# Patient Record
Sex: FEMALE | Race: BLACK OR AFRICAN AMERICAN | NOT HISPANIC OR LATINO
[De-identification: names, ages, dates, MRNs, and addresses within clinical notes are randomized per-mention and may not be internally consistent; named-entity substitution may affect disease eponyms.]

---

## 2017-01-17 PROCEDURE — 99214 OFFICE O/P EST MOD 30 MIN: CPT

## 2017-01-19 ENCOUNTER — APPOINTMENT (OUTPATIENT)
Dept: GASTROENTEROLOGY | Facility: HOSPITAL | Age: 72
End: 2017-01-19

## 2017-01-23 ENCOUNTER — RESULT REVIEW (OUTPATIENT)
Age: 72
End: 2017-01-23

## 2017-01-24 ENCOUNTER — OUTPATIENT (OUTPATIENT)
Dept: OUTPATIENT SERVICES | Facility: HOSPITAL | Age: 72
LOS: 1 days | Discharge: HOME | End: 2017-01-24
Payer: MEDICARE

## 2017-01-24 DIAGNOSIS — Z98.89 OTHER SPECIFIED POSTPROCEDURAL STATES: Chronic | ICD-10-CM

## 2017-01-24 DIAGNOSIS — Z98.49 CATARACT EXTRACTION STATUS, UNSPECIFIED EYE: Chronic | ICD-10-CM

## 2017-01-24 DIAGNOSIS — Z00.00 ENCOUNTER FOR GENERAL ADULT MEDICAL EXAMINATION WITHOUT ABNORMAL FINDINGS: ICD-10-CM

## 2017-01-24 PROCEDURE — 88305 TISSUE EXAM BY PATHOLOGIST: CPT | Mod: 26

## 2017-01-24 PROCEDURE — 45380 COLONOSCOPY AND BIOPSY: CPT | Mod: GC

## 2017-01-26 LAB — SURGICAL PATHOLOGY STUDY: SIGNIFICANT CHANGE UP

## 2017-03-02 ENCOUNTER — OUTPATIENT (OUTPATIENT)
Dept: OUTPATIENT SERVICES | Facility: HOSPITAL | Age: 72
LOS: 1 days | End: 2017-03-02

## 2017-03-02 ENCOUNTER — APPOINTMENT (OUTPATIENT)
Dept: GASTROENTEROLOGY | Facility: HOSPITAL | Age: 72
End: 2017-03-02

## 2017-03-02 VITALS
HEART RATE: 56 BPM | BODY MASS INDEX: 22.36 KG/M2 | SYSTOLIC BLOOD PRESSURE: 143 MMHG | DIASTOLIC BLOOD PRESSURE: 79 MMHG | HEIGHT: 64 IN | WEIGHT: 131 LBS

## 2017-03-02 DIAGNOSIS — Z98.89 OTHER SPECIFIED POSTPROCEDURAL STATES: Chronic | ICD-10-CM

## 2017-03-02 DIAGNOSIS — Z87.19 PERSONAL HISTORY OF OTHER DISEASES OF THE DIGESTIVE SYSTEM: ICD-10-CM

## 2017-03-02 DIAGNOSIS — Z98.49 CATARACT EXTRACTION STATUS, UNSPECIFIED EYE: Chronic | ICD-10-CM

## 2017-03-02 DIAGNOSIS — K57.30 DIVERTICULOSIS OF LARGE INTESTINE WITHOUT PERFORATION OR ABSCESS WITHOUT BLEEDING: ICD-10-CM

## 2017-03-02 DIAGNOSIS — K57.30 DIVERTICULOSIS OF LARGE INTESTINE W/OUT PERFORATION OR ABSCESS W/OUT BLEEDING: ICD-10-CM

## 2017-03-02 DIAGNOSIS — Z12.11 ENCOUNTER FOR SCREENING FOR MALIGNANT NEOPLASM OF COLON: ICD-10-CM

## 2017-04-04 PROCEDURE — 99214 OFFICE O/P EST MOD 30 MIN: CPT

## 2017-04-25 ENCOUNTER — OUTPATIENT (OUTPATIENT)
Dept: OUTPATIENT SERVICES | Facility: HOSPITAL | Age: 72
LOS: 1 days | End: 2017-04-25
Payer: MEDICARE

## 2017-04-25 ENCOUNTER — APPOINTMENT (OUTPATIENT)
Dept: INTERNAL MEDICINE | Facility: HOSPITAL | Age: 72
End: 2017-04-25

## 2017-04-25 VITALS — BODY MASS INDEX: 22.71 KG/M2 | WEIGHT: 133 LBS | HEIGHT: 64 IN

## 2017-04-25 VITALS — DIASTOLIC BLOOD PRESSURE: 69 MMHG | SYSTOLIC BLOOD PRESSURE: 168 MMHG | HEART RATE: 42 BPM

## 2017-04-25 DIAGNOSIS — I38 ENDOCARDITIS, VALVE UNSPECIFIED: ICD-10-CM

## 2017-04-25 DIAGNOSIS — Z98.89 OTHER SPECIFIED POSTPROCEDURAL STATES: Chronic | ICD-10-CM

## 2017-04-25 DIAGNOSIS — Z98.49 CATARACT EXTRACTION STATUS, UNSPECIFIED EYE: Chronic | ICD-10-CM

## 2017-04-26 DIAGNOSIS — I10 ESSENTIAL (PRIMARY) HYPERTENSION: ICD-10-CM

## 2017-04-26 DIAGNOSIS — F29 UNSPECIFIED PSYCHOSIS NOT DUE TO A SUBSTANCE OR KNOWN PHYSIOLOGICAL CONDITION: ICD-10-CM

## 2017-04-26 DIAGNOSIS — I38 ENDOCARDITIS, VALVE UNSPECIFIED: ICD-10-CM

## 2017-04-26 DIAGNOSIS — M25.561 PAIN IN RIGHT KNEE: ICD-10-CM

## 2017-06-13 PROCEDURE — 99213 OFFICE O/P EST LOW 20 MIN: CPT

## 2017-09-15 PROCEDURE — 99213 OFFICE O/P EST LOW 20 MIN: CPT

## 2017-09-26 ENCOUNTER — APPOINTMENT (OUTPATIENT)
Dept: OPHTHALMOLOGY | Facility: CLINIC | Age: 72
End: 2017-09-26
Payer: MEDICARE

## 2017-09-26 PROCEDURE — 92014 COMPRE OPH EXAM EST PT 1/>: CPT

## 2017-09-26 PROCEDURE — 92134 CPTRZ OPH DX IMG PST SGM RTA: CPT

## 2017-10-16 ENCOUNTER — MED ADMIN CHARGE (OUTPATIENT)
Age: 72
End: 2017-10-16

## 2017-10-16 ENCOUNTER — APPOINTMENT (OUTPATIENT)
Dept: INTERNAL MEDICINE | Facility: HOSPITAL | Age: 72
End: 2017-10-16
Payer: MEDICARE

## 2017-10-16 ENCOUNTER — OUTPATIENT (OUTPATIENT)
Dept: OUTPATIENT SERVICES | Facility: HOSPITAL | Age: 72
LOS: 1 days | End: 2017-10-16

## 2017-10-16 VITALS — SYSTOLIC BLOOD PRESSURE: 164 MMHG | DIASTOLIC BLOOD PRESSURE: 78 MMHG | HEART RATE: 52 BPM

## 2017-10-16 VITALS — BODY MASS INDEX: 23.22 KG/M2 | WEIGHT: 136 LBS | HEIGHT: 64 IN

## 2017-10-16 DIAGNOSIS — Z98.49 CATARACT EXTRACTION STATUS, UNSPECIFIED EYE: Chronic | ICD-10-CM

## 2017-10-16 DIAGNOSIS — Z23 ENCOUNTER FOR IMMUNIZATION: ICD-10-CM

## 2017-10-16 DIAGNOSIS — Z98.89 OTHER SPECIFIED POSTPROCEDURAL STATES: Chronic | ICD-10-CM

## 2017-10-16 PROCEDURE — 99213 OFFICE O/P EST LOW 20 MIN: CPT | Mod: GE

## 2017-10-19 DIAGNOSIS — R01.1 CARDIAC MURMUR, UNSPECIFIED: ICD-10-CM

## 2017-10-19 DIAGNOSIS — I10 ESSENTIAL (PRIMARY) HYPERTENSION: ICD-10-CM

## 2017-10-26 ENCOUNTER — APPOINTMENT (OUTPATIENT)
Dept: CV DIAGNOSITCS | Facility: HOSPITAL | Age: 72
End: 2017-10-26

## 2017-10-26 ENCOUNTER — OUTPATIENT (OUTPATIENT)
Dept: OUTPATIENT SERVICES | Facility: HOSPITAL | Age: 72
LOS: 1 days | End: 2017-10-26
Payer: MEDICARE

## 2017-10-26 DIAGNOSIS — Z98.49 CATARACT EXTRACTION STATUS, UNSPECIFIED EYE: Chronic | ICD-10-CM

## 2017-10-26 DIAGNOSIS — Z98.89 OTHER SPECIFIED POSTPROCEDURAL STATES: Chronic | ICD-10-CM

## 2017-10-26 DIAGNOSIS — I10 ESSENTIAL (PRIMARY) HYPERTENSION: ICD-10-CM

## 2017-10-26 PROCEDURE — 93306 TTE W/DOPPLER COMPLETE: CPT | Mod: 26

## 2017-11-08 ENCOUNTER — FORM ENCOUNTER (OUTPATIENT)
Age: 72
End: 2017-11-08

## 2017-11-09 ENCOUNTER — OUTPATIENT (OUTPATIENT)
Dept: OUTPATIENT SERVICES | Facility: HOSPITAL | Age: 72
LOS: 1 days | End: 2017-11-09
Payer: MEDICARE

## 2017-11-09 ENCOUNTER — APPOINTMENT (OUTPATIENT)
Dept: MAMMOGRAPHY | Facility: IMAGING CENTER | Age: 72
End: 2017-11-09
Payer: MEDICARE

## 2017-11-09 DIAGNOSIS — Z98.49 CATARACT EXTRACTION STATUS, UNSPECIFIED EYE: Chronic | ICD-10-CM

## 2017-11-09 DIAGNOSIS — Z00.00 ENCOUNTER FOR GENERAL ADULT MEDICAL EXAMINATION WITHOUT ABNORMAL FINDINGS: ICD-10-CM

## 2017-11-09 DIAGNOSIS — R01.1 CARDIAC MURMUR, UNSPECIFIED: ICD-10-CM

## 2017-11-09 DIAGNOSIS — Z98.89 OTHER SPECIFIED POSTPROCEDURAL STATES: Chronic | ICD-10-CM

## 2017-11-09 PROCEDURE — 77063 BREAST TOMOSYNTHESIS BI: CPT | Mod: 26

## 2017-11-09 PROCEDURE — G0202: CPT | Mod: 26

## 2017-11-09 PROCEDURE — 77067 SCR MAMMO BI INCL CAD: CPT

## 2017-11-09 PROCEDURE — 77063 BREAST TOMOSYNTHESIS BI: CPT

## 2017-11-20 ENCOUNTER — APPOINTMENT (OUTPATIENT)
Dept: INTERNAL MEDICINE | Facility: HOSPITAL | Age: 72
End: 2017-11-20
Payer: MEDICARE

## 2017-11-20 ENCOUNTER — OUTPATIENT (OUTPATIENT)
Dept: OUTPATIENT SERVICES | Facility: HOSPITAL | Age: 72
LOS: 1 days | End: 2017-11-20
Payer: MEDICARE

## 2017-11-20 VITALS — SYSTOLIC BLOOD PRESSURE: 185 MMHG | DIASTOLIC BLOOD PRESSURE: 85 MMHG | HEART RATE: 60 BPM

## 2017-11-20 VITALS — HEIGHT: 64 IN | WEIGHT: 136 LBS | BODY MASS INDEX: 23.22 KG/M2

## 2017-11-20 DIAGNOSIS — M25.561 PAIN IN RIGHT KNEE: ICD-10-CM

## 2017-11-20 DIAGNOSIS — Z98.89 OTHER SPECIFIED POSTPROCEDURAL STATES: Chronic | ICD-10-CM

## 2017-11-20 DIAGNOSIS — Z98.49 CATARACT EXTRACTION STATUS, UNSPECIFIED EYE: Chronic | ICD-10-CM

## 2017-11-20 PROCEDURE — 99213 OFFICE O/P EST LOW 20 MIN: CPT | Mod: GE

## 2017-11-22 DIAGNOSIS — I10 ESSENTIAL (PRIMARY) HYPERTENSION: ICD-10-CM

## 2017-11-22 DIAGNOSIS — F29 UNSPECIFIED PSYCHOSIS NOT DUE TO A SUBSTANCE OR KNOWN PHYSIOLOGICAL CONDITION: ICD-10-CM

## 2017-12-28 PROCEDURE — 99213 OFFICE O/P EST LOW 20 MIN: CPT

## 2018-05-14 ENCOUNTER — LABORATORY RESULT (OUTPATIENT)
Age: 73
End: 2018-05-14

## 2018-05-14 ENCOUNTER — APPOINTMENT (OUTPATIENT)
Dept: INTERNAL MEDICINE | Facility: HOSPITAL | Age: 73
End: 2018-05-14
Payer: MEDICARE

## 2018-05-14 ENCOUNTER — OUTPATIENT (OUTPATIENT)
Dept: OUTPATIENT SERVICES | Facility: HOSPITAL | Age: 73
LOS: 1 days | End: 2018-05-14

## 2018-05-14 VITALS — BODY MASS INDEX: 23.73 KG/M2 | HEIGHT: 64 IN | WEIGHT: 139 LBS

## 2018-05-14 VITALS — HEART RATE: 50 BPM | SYSTOLIC BLOOD PRESSURE: 173 MMHG | DIASTOLIC BLOOD PRESSURE: 72 MMHG

## 2018-05-14 DIAGNOSIS — Z98.89 OTHER SPECIFIED POSTPROCEDURAL STATES: Chronic | ICD-10-CM

## 2018-05-14 DIAGNOSIS — Z98.49 CATARACT EXTRACTION STATUS, UNSPECIFIED EYE: Chronic | ICD-10-CM

## 2018-05-14 LAB
ALBUMIN SERPL ELPH-MCNC: 4.4 G/DL — SIGNIFICANT CHANGE UP (ref 3.3–5)
ALP SERPL-CCNC: 75 U/L — SIGNIFICANT CHANGE UP (ref 40–120)
ALT FLD-CCNC: 11 U/L — SIGNIFICANT CHANGE UP (ref 4–33)
AST SERPL-CCNC: 23 U/L — SIGNIFICANT CHANGE UP (ref 4–32)
BASOPHILS # BLD AUTO: 0.02 K/UL — SIGNIFICANT CHANGE UP (ref 0–0.2)
BASOPHILS NFR BLD AUTO: 0.5 % — SIGNIFICANT CHANGE UP (ref 0–2)
BILIRUB SERPL-MCNC: 0.3 MG/DL — SIGNIFICANT CHANGE UP (ref 0.2–1.2)
BUN SERPL-MCNC: 15 MG/DL — SIGNIFICANT CHANGE UP (ref 7–23)
CALCIUM SERPL-MCNC: 9.3 MG/DL — SIGNIFICANT CHANGE UP (ref 8.4–10.5)
CHLORIDE SERPL-SCNC: 100 MMOL/L — SIGNIFICANT CHANGE UP (ref 98–107)
CO2 SERPL-SCNC: 30 MMOL/L — SIGNIFICANT CHANGE UP (ref 22–31)
CREAT SERPL-MCNC: 0.82 MG/DL — SIGNIFICANT CHANGE UP (ref 0.5–1.3)
EOSINOPHIL # BLD AUTO: 0.04 K/UL — SIGNIFICANT CHANGE UP (ref 0–0.5)
EOSINOPHIL NFR BLD AUTO: 0.9 % — SIGNIFICANT CHANGE UP (ref 0–6)
GLUCOSE SERPL-MCNC: 83 MG/DL — SIGNIFICANT CHANGE UP (ref 70–99)
HBA1C BLD-MCNC: 4.9 % — SIGNIFICANT CHANGE UP (ref 4–5.6)
HCT VFR BLD CALC: 37 % — SIGNIFICANT CHANGE UP (ref 34.5–45)
HGB BLD-MCNC: 11.9 G/DL — SIGNIFICANT CHANGE UP (ref 11.5–15.5)
IMM GRANULOCYTES # BLD AUTO: 0.01 # — SIGNIFICANT CHANGE UP
IMM GRANULOCYTES NFR BLD AUTO: 0.2 % — SIGNIFICANT CHANGE UP (ref 0–1.5)
LYMPHOCYTES # BLD AUTO: 1.19 K/UL — SIGNIFICANT CHANGE UP (ref 1–3.3)
LYMPHOCYTES # BLD AUTO: 27.2 % — SIGNIFICANT CHANGE UP (ref 13–44)
MCHC RBC-ENTMCNC: 28.3 PG — SIGNIFICANT CHANGE UP (ref 27–34)
MCHC RBC-ENTMCNC: 32.2 % — SIGNIFICANT CHANGE UP (ref 32–36)
MCV RBC AUTO: 88.1 FL — SIGNIFICANT CHANGE UP (ref 80–100)
MONOCYTES # BLD AUTO: 0.53 K/UL — SIGNIFICANT CHANGE UP (ref 0–0.9)
MONOCYTES NFR BLD AUTO: 12.1 % — SIGNIFICANT CHANGE UP (ref 2–14)
NEUTROPHILS # BLD AUTO: 2.59 K/UL — SIGNIFICANT CHANGE UP (ref 1.8–7.4)
NEUTROPHILS NFR BLD AUTO: 59.1 % — SIGNIFICANT CHANGE UP (ref 43–77)
NRBC # FLD: 0 — SIGNIFICANT CHANGE UP
PLATELET # BLD AUTO: 184 K/UL — SIGNIFICANT CHANGE UP (ref 150–400)
PMV BLD: 13.5 FL — HIGH (ref 7–13)
POTASSIUM SERPL-MCNC: 4.1 MMOL/L — SIGNIFICANT CHANGE UP (ref 3.5–5.3)
POTASSIUM SERPL-SCNC: 4.1 MMOL/L — SIGNIFICANT CHANGE UP (ref 3.5–5.3)
PROT SERPL-MCNC: 7.3 G/DL — SIGNIFICANT CHANGE UP (ref 6–8.3)
RBC # BLD: 4.2 M/UL — SIGNIFICANT CHANGE UP (ref 3.8–5.2)
RBC # FLD: 13.8 % — SIGNIFICANT CHANGE UP (ref 10.3–14.5)
SODIUM SERPL-SCNC: 140 MMOL/L — SIGNIFICANT CHANGE UP (ref 135–145)
WBC # BLD: 4.38 K/UL — SIGNIFICANT CHANGE UP (ref 3.8–10.5)
WBC # FLD AUTO: 4.38 K/UL — SIGNIFICANT CHANGE UP (ref 3.8–10.5)

## 2018-05-14 PROCEDURE — 99213 OFFICE O/P EST LOW 20 MIN: CPT | Mod: GE

## 2018-05-15 DIAGNOSIS — F29 UNSPECIFIED PSYCHOSIS NOT DUE TO A SUBSTANCE OR KNOWN PHYSIOLOGICAL CONDITION: ICD-10-CM

## 2018-05-15 DIAGNOSIS — M85.80 OTHER SPECIFIED DISORDERS OF BONE DENSITY AND STRUCTURE, UNSPECIFIED SITE: ICD-10-CM

## 2018-05-15 DIAGNOSIS — I10 ESSENTIAL (PRIMARY) HYPERTENSION: ICD-10-CM

## 2018-05-15 LAB
VZV IGG FLD QL IA: 627.6 INDEX — SIGNIFICANT CHANGE UP
VZV IGG FLD QL IA: POSITIVE — SIGNIFICANT CHANGE UP

## 2018-06-07 PROCEDURE — 99213 OFFICE O/P EST LOW 20 MIN: CPT

## 2018-06-15 ENCOUNTER — OUTPATIENT (OUTPATIENT)
Dept: OUTPATIENT SERVICES | Facility: HOSPITAL | Age: 73
LOS: 1 days | End: 2018-06-15
Payer: MEDICARE

## 2018-06-15 ENCOUNTER — APPOINTMENT (OUTPATIENT)
Dept: DERMATOLOGY | Facility: HOSPITAL | Age: 73
End: 2018-06-15
Payer: MEDICARE

## 2018-06-15 VITALS
HEART RATE: 43 BPM | WEIGHT: 139 LBS | HEIGHT: 64 IN | SYSTOLIC BLOOD PRESSURE: 155 MMHG | BODY MASS INDEX: 23.73 KG/M2 | DIASTOLIC BLOOD PRESSURE: 76 MMHG

## 2018-06-15 DIAGNOSIS — L72.0 EPIDERMAL CYST: ICD-10-CM

## 2018-06-15 DIAGNOSIS — Z98.49 CATARACT EXTRACTION STATUS, UNSPECIFIED EYE: Chronic | ICD-10-CM

## 2018-06-15 DIAGNOSIS — Z98.89 OTHER SPECIFIED POSTPROCEDURAL STATES: Chronic | ICD-10-CM

## 2018-06-15 PROCEDURE — 99201 OFFICE OUTPATIENT NEW 10 MINUTES: CPT | Mod: GC

## 2018-07-22 PROBLEM — K57.30 DIVERTICULOSIS OF COLON: Status: ACTIVE | Noted: 2017-03-02

## 2018-07-27 ENCOUNTER — LABORATORY RESULT (OUTPATIENT)
Age: 73
End: 2018-07-27

## 2018-07-27 ENCOUNTER — APPOINTMENT (OUTPATIENT)
Dept: DERMATOLOGY | Facility: CLINIC | Age: 73
End: 2018-07-27
Payer: MEDICARE

## 2018-07-27 PROCEDURE — 12031 INTMD RPR S/A/T/EXT 2.5 CM/<: CPT

## 2018-07-27 PROCEDURE — 11402 EXC TR-EXT B9+MARG 1.1-2 CM: CPT

## 2018-08-03 ENCOUNTER — APPOINTMENT (OUTPATIENT)
Dept: DERMATOLOGY | Facility: CLINIC | Age: 73
End: 2018-08-03
Payer: MEDICARE

## 2018-08-03 VITALS — WEIGHT: 139 LBS | HEIGHT: 64 IN | BODY MASS INDEX: 23.73 KG/M2

## 2018-08-03 DIAGNOSIS — L72.0 EPIDERMAL CYST: ICD-10-CM

## 2018-08-03 PROCEDURE — 99024 POSTOP FOLLOW-UP VISIT: CPT

## 2018-08-27 ENCOUNTER — APPOINTMENT (OUTPATIENT)
Dept: DERMATOLOGY | Facility: CLINIC | Age: 73
End: 2018-08-27
Payer: MEDICARE

## 2018-08-27 PROCEDURE — 99212 OFFICE O/P EST SF 10 MIN: CPT

## 2018-08-30 PROCEDURE — 99213 OFFICE O/P EST LOW 20 MIN: CPT

## 2018-10-09 ENCOUNTER — APPOINTMENT (OUTPATIENT)
Dept: OPHTHALMOLOGY | Facility: CLINIC | Age: 73
End: 2018-10-09
Payer: MEDICARE

## 2018-10-09 DIAGNOSIS — H43.812 VITREOUS DEGENERATION, LEFT EYE: ICD-10-CM

## 2018-10-09 DIAGNOSIS — H43.393 OTHER VITREOUS OPACITIES, BILATERAL: ICD-10-CM

## 2018-10-09 PROCEDURE — 92134 CPTRZ OPH DX IMG PST SGM RTA: CPT

## 2018-10-09 PROCEDURE — 92014 COMPRE OPH EXAM EST PT 1/>: CPT

## 2018-10-23 ENCOUNTER — LABORATORY RESULT (OUTPATIENT)
Age: 73
End: 2018-10-23

## 2018-10-23 ENCOUNTER — OTHER (OUTPATIENT)
Age: 73
End: 2018-10-23

## 2018-10-23 ENCOUNTER — MED ADMIN CHARGE (OUTPATIENT)
Age: 73
End: 2018-10-23

## 2018-10-23 ENCOUNTER — APPOINTMENT (OUTPATIENT)
Dept: INTERNAL MEDICINE | Facility: HOSPITAL | Age: 73
End: 2018-10-23
Payer: MEDICARE

## 2018-10-23 ENCOUNTER — OUTPATIENT (OUTPATIENT)
Dept: OUTPATIENT SERVICES | Facility: HOSPITAL | Age: 73
LOS: 1 days | End: 2018-10-23
Payer: MEDICARE

## 2018-10-23 VITALS — SYSTOLIC BLOOD PRESSURE: 196 MMHG | DIASTOLIC BLOOD PRESSURE: 76 MMHG | HEART RATE: 52 BPM

## 2018-10-23 VITALS — WEIGHT: 140 LBS | HEIGHT: 64 IN | BODY MASS INDEX: 23.9 KG/M2

## 2018-10-23 DIAGNOSIS — Z98.89 OTHER SPECIFIED POSTPROCEDURAL STATES: Chronic | ICD-10-CM

## 2018-10-23 DIAGNOSIS — Z23 ENCOUNTER FOR IMMUNIZATION: ICD-10-CM

## 2018-10-23 DIAGNOSIS — Z98.49 CATARACT EXTRACTION STATUS, UNSPECIFIED EYE: Chronic | ICD-10-CM

## 2018-10-23 LAB
CHOLEST SERPL-MCNC: 204 MG/DL — HIGH (ref 120–199)
HDLC SERPL-MCNC: 63 MG/DL — SIGNIFICANT CHANGE UP (ref 45–65)
LIPID PNL WITH DIRECT LDL SERPL: 131 MG/DL — SIGNIFICANT CHANGE UP
TRIGL SERPL-MCNC: 50 MG/DL — SIGNIFICANT CHANGE UP (ref 10–149)

## 2018-10-23 PROCEDURE — 99214 OFFICE O/P EST MOD 30 MIN: CPT | Mod: GC

## 2018-10-23 NOTE — PHYSICAL EXAM
[No Acute Distress] : no acute distress [Well Nourished] : well nourished [Well Developed] : well developed [Well-Appearing] : well-appearing [Normal Sclera/Conjunctiva] : normal sclera/conjunctiva [PERRL] : pupils equal round and reactive to light [EOMI] : extraocular movements intact [Normal Outer Ear/Nose] : the outer ears and nose were normal in appearance [Normal Oropharynx] : the oropharynx was normal [No JVD] : no jugular venous distention [Supple] : supple [No Lymphadenopathy] : no lymphadenopathy [No Respiratory Distress] : no respiratory distress  [Clear to Auscultation] : lungs were clear to auscultation bilaterally [No Accessory Muscle Use] : no accessory muscle use [Normal Rate] : normal rate  [Regular Rhythm] : with a regular rhythm [Normal S1, S2] : normal S1 and S2 [No Murmur] : no murmur heard [Pedal Pulses Present] : the pedal pulses are present [No Edema] : there was no peripheral edema [Soft] : abdomen soft [Non Tender] : non-tender [Non-distended] : non-distended [No Masses] : no abdominal mass palpated [No HSM] : no HSM [Normal Bowel Sounds] : normal bowel sounds [Normal Gait] : normal gait [No Focal Deficits] : no focal deficits [Normal Affect] : the affect was normal [Normal Insight/Judgement] : insight and judgment were intact

## 2018-10-26 DIAGNOSIS — F29 UNSPECIFIED PSYCHOSIS NOT DUE TO A SUBSTANCE OR KNOWN PHYSIOLOGICAL CONDITION: ICD-10-CM

## 2018-10-26 DIAGNOSIS — I10 ESSENTIAL (PRIMARY) HYPERTENSION: ICD-10-CM

## 2018-10-26 NOTE — PLAN
[FreeTextEntry1] : The patient is a 73 year old female with a PMH of HTN, bradycardia, and psychosis who presents for a follow-up visit.\par \par 1) Hypertension - Patient's blood pressure was elevated on physical examination. Repeat blood pressure in left arm was 194/69 mm Hg. Patient has a documented history of white coat hypertension. Patient is currently asymptomatic.\par - continue with low-salt diet and exercise\par - ordered lipid panel; awaiting results. \par - continue to monitor\par \par 2) Psychosis - Continue to monitor. \par - Continue with risperidone 0.5 mg qhs.\par \par 3) Health Maintenance \par - Patient's last mammogram was in 2017. The results were normal. She is now 73 years old, so a mammogram is no longer indicated. \par - she has a history of TAHBSO, so pap smears have not been needed.\par - Per GI, colonoscopies are no longer needed after age 75\par - She received her influenza vaccine today. \par - will address DEXA scan at next visit\par \par Patient discussed with Dr. Cortez.\par All risks and benefits were discussed with patient. \par Patient and attending agreed with the above assessment and plan.\par \par \par Precious Montejo M.D. PGY1\par Internal Medicine\par [facility]\par [phone]

## 2018-10-26 NOTE — HISTORY OF PRESENT ILLNESS
[FreeTextEntry1] : The patient is a 73 year old female with a PMH of HTN, bradycardia, and psychosis who presents for a follow-up visit. [de-identified] : 1) Hypertension - The patient is not on any antihypertensives. She measures her blood pressure regularly with her home blood pressure cuff. Her systolic blood pressure ranges between 120s and 130s, with the occasional 140-150 mm Hg reading. Diastolically, she is consistently in the 60s-70s. She manages her blood pressure with a low-salt diet. She eats fresh fruits and vegetables daily. She walks 30 minutes daily. She gardens in the summer time and does household chores all year round. She sleeps approximately 7 hours per night. She does not drink alcohol, smoke cigarettes or use drugs. She denies dizziness, headache, chest pain, SOB, or palpitations.\par \par 2) Psychosis - the patient sees her psychiatrist every three months. Her psychiatrist has told her that she is doing well. She takes her risperidone every night. She denies unusual behavior, auditory hallucinations or visual hallucinations. \par \par 3) Health maintenance - the patient states that she is due for her next mammogram. \par

## 2018-11-15 PROCEDURE — 99213 OFFICE O/P EST LOW 20 MIN: CPT

## 2019-02-06 ENCOUNTER — LABORATORY RESULT (OUTPATIENT)
Age: 74
End: 2019-02-06

## 2019-02-06 ENCOUNTER — APPOINTMENT (OUTPATIENT)
Dept: INTERNAL MEDICINE | Facility: HOSPITAL | Age: 74
End: 2019-02-06
Payer: MEDICARE

## 2019-02-06 ENCOUNTER — OUTPATIENT (OUTPATIENT)
Dept: OUTPATIENT SERVICES | Facility: HOSPITAL | Age: 74
LOS: 1 days | End: 2019-02-06
Payer: MEDICARE

## 2019-02-06 VITALS — HEART RATE: 46 BPM | DIASTOLIC BLOOD PRESSURE: 77 MMHG | SYSTOLIC BLOOD PRESSURE: 169 MMHG

## 2019-02-06 VITALS — HEIGHT: 64 IN | BODY MASS INDEX: 23.39 KG/M2 | WEIGHT: 137 LBS

## 2019-02-06 DIAGNOSIS — Z98.89 OTHER SPECIFIED POSTPROCEDURAL STATES: Chronic | ICD-10-CM

## 2019-02-06 DIAGNOSIS — Z98.49 CATARACT EXTRACTION STATUS, UNSPECIFIED EYE: Chronic | ICD-10-CM

## 2019-02-06 LAB
ANION GAP SERPL CALC-SCNC: 11 MMO/L — SIGNIFICANT CHANGE UP (ref 7–14)
BUN SERPL-MCNC: 10 MG/DL — SIGNIFICANT CHANGE UP (ref 7–23)
CALCIUM SERPL-MCNC: 10.1 MG/DL — SIGNIFICANT CHANGE UP (ref 8.4–10.5)
CHLORIDE SERPL-SCNC: 101 MMOL/L — SIGNIFICANT CHANGE UP (ref 98–107)
CO2 SERPL-SCNC: 30 MMOL/L — SIGNIFICANT CHANGE UP (ref 22–31)
CREAT SERPL-MCNC: 0.94 MG/DL — SIGNIFICANT CHANGE UP (ref 0.5–1.3)
GLUCOSE SERPL-MCNC: 79 MG/DL — SIGNIFICANT CHANGE UP (ref 70–99)
HBA1C BLD-MCNC: 4.6 % — SIGNIFICANT CHANGE UP (ref 4–5.6)
POTASSIUM SERPL-MCNC: 4.4 MMOL/L — SIGNIFICANT CHANGE UP (ref 3.5–5.3)
POTASSIUM SERPL-SCNC: 4.4 MMOL/L — SIGNIFICANT CHANGE UP (ref 3.5–5.3)
SODIUM SERPL-SCNC: 142 MMOL/L — SIGNIFICANT CHANGE UP (ref 135–145)

## 2019-02-06 PROCEDURE — 99214 OFFICE O/P EST MOD 30 MIN: CPT | Mod: GC

## 2019-02-06 NOTE — PLAN
[FreeTextEntry1] : The patient is a 73 year old female with a PMH of HTN, Psychosis, Osteopenia, and Bradycardia\par \par 1) Chronic low back pain - the patient had a DEXA scan in 11/2016 which revealed a normal spine, but osteopenia of the hip and femoral neck. \par - will prescribe Vitamin D 1000 IU and 600 mg Calcium supplements, as they are more potent than OsCAL supplements\par - patient will be referred for a DEXA scan\par - patient will take Tylenol as needed for the pain\par \par 2) Hypertension - the patient continues to experience white coat hypertension as her blood pressure on examination was 169/77 mm Hg. However, home monitoring kit shows that her blood pressure remains elevated at 155-161 mm Hg systolically.\par - blood pressure goal is <150/90 mm Hg \par - will start patient on 5 mg amlodipine po daily and re-assess at next visit\par - will check BMP and HbA1C\par - the patient will continue with her current diet and exercise regimen\par \par 3) Psychosis \par - continue with risperidone 0.5 mg qhs \par - continue to monitor\par \par 4) Systolic Ejection Murmur - the patient's last echocardiogram was in 03/2014.\par - will refer for a new echocardiogram to assess for any changes\par - continue to monitor \par \par 5) HCM - the patient declined the shingrix vaccine.\par - referred patient for DEXA scan\par - the patient will receive her Tdap today\par - the patient is up-to-date on her influenza vaccination\par \par Patient discussed with Dr. Ya.\par All risks and benefits were discussed with patient. \par Patient and attending agreed with the above assessment and plan.\par \par Precious Montejo M.D. PGY1\par Internal Medicine\par [facility]\par [phone]

## 2019-02-06 NOTE — HISTORY OF PRESENT ILLNESS
[FreeTextEntry1] : The patient is here for follow-up. [de-identified] : The patient is a 73 year old female with a PMH of HTN, Psychosis, and Bradycardia\par \par 1) Chronic low back pain - the patient has been having low back pain for many years. At times her back feels weak when doing daily chores and moving around in her daily activities. The pain resolves with Tylenol.  She takes the OsCAL supplements daily.\par \par 2) Hypertension - the patient has been monitoring her blood pressure daily with a home blood pressure cuff. The blood pressure readings range systolically from 155-161 mm Hg mostly. She continues to have 5-6 hours of sleep and walks 15-30 minutes daily. She eats a low-salt diet consisting of broccoli, cabbage, kale, green bananas, plantains, yams, squash, cucumber, eggplant, cauliflower, fish, and chicken. She does not eat canned foods or takeout. She does not drink alcohol, smoke tobacco or use drugs. She denies vision changes, chest pain, palpitations or SOB.\par \par 3) Psychosis - the patient feels well. She continues to take risperidone nightly and sees Psychiatry at [facility] every three months. She denies auditory or visual hallucinations. \par

## 2019-02-06 NOTE — PHYSICAL EXAM
[No Acute Distress] : no acute distress [Well Nourished] : well nourished [Well Developed] : well developed [Well-Appearing] : well-appearing [Normal Sclera/Conjunctiva] : normal sclera/conjunctiva [PERRL] : pupils equal round and reactive to light [EOMI] : extraocular movements intact [Normal Outer Ear/Nose] : the outer ears and nose were normal in appearance [Normal Oropharynx] : the oropharynx was normal [No JVD] : no jugular venous distention [Supple] : supple [No Lymphadenopathy] : no lymphadenopathy [No Respiratory Distress] : no respiratory distress  [Clear to Auscultation] : lungs were clear to auscultation bilaterally [No Accessory Muscle Use] : no accessory muscle use [Normal Rate] : normal rate  [Regular Rhythm] : with a regular rhythm [Normal S1, S2] : normal S1 and S2 [Pedal Pulses Present] : the pedal pulses are present [No Edema] : there was no peripheral edema [Soft] : abdomen soft [Non Tender] : non-tender [Non-distended] : non-distended [No Masses] : no abdominal mass palpated [No HSM] : no HSM [Normal Bowel Sounds] : normal bowel sounds [Normal Posterior Cervical Nodes] : no posterior cervical lymphadenopathy [Normal Anterior Cervical Nodes] : no anterior cervical lymphadenopathy [No Rash] : no rash [Normal Gait] : normal gait [Coordination Grossly Intact] : coordination grossly intact [No Focal Deficits] : no focal deficits [Normal Affect] : the affect was normal [Normal Insight/Judgement] : insight and judgment were intact [de-identified] : 2/6 Systolic Ejection Murmur

## 2019-02-07 DIAGNOSIS — Z23 ENCOUNTER FOR IMMUNIZATION: ICD-10-CM

## 2019-02-07 DIAGNOSIS — F29 UNSPECIFIED PSYCHOSIS NOT DUE TO A SUBSTANCE OR KNOWN PHYSIOLOGICAL CONDITION: ICD-10-CM

## 2019-02-07 DIAGNOSIS — R01.1 CARDIAC MURMUR, UNSPECIFIED: ICD-10-CM

## 2019-02-07 DIAGNOSIS — I10 ESSENTIAL (PRIMARY) HYPERTENSION: ICD-10-CM

## 2019-02-07 DIAGNOSIS — M85.80 OTHER SPECIFIED DISORDERS OF BONE DENSITY AND STRUCTURE, UNSPECIFIED SITE: ICD-10-CM

## 2019-02-14 ENCOUNTER — FORM ENCOUNTER (OUTPATIENT)
Age: 74
End: 2019-02-14

## 2019-02-15 ENCOUNTER — OUTPATIENT (OUTPATIENT)
Dept: OUTPATIENT SERVICES | Facility: HOSPITAL | Age: 74
LOS: 1 days | End: 2019-02-15
Payer: MEDICARE

## 2019-02-15 ENCOUNTER — APPOINTMENT (OUTPATIENT)
Dept: RADIOLOGY | Facility: IMAGING CENTER | Age: 74
End: 2019-02-15
Payer: MEDICARE

## 2019-02-15 ENCOUNTER — OUTPATIENT (OUTPATIENT)
Dept: OUTPATIENT SERVICES | Facility: HOSPITAL | Age: 74
LOS: 1 days | End: 2019-02-15

## 2019-02-15 ENCOUNTER — APPOINTMENT (OUTPATIENT)
Dept: CV DIAGNOSITCS | Facility: HOSPITAL | Age: 74
End: 2019-02-15
Payer: MEDICARE

## 2019-02-15 DIAGNOSIS — Z98.49 CATARACT EXTRACTION STATUS, UNSPECIFIED EYE: Chronic | ICD-10-CM

## 2019-02-15 DIAGNOSIS — M85.80 OTHER SPECIFIED DISORDERS OF BONE DENSITY AND STRUCTURE, UNSPECIFIED SITE: ICD-10-CM

## 2019-02-15 DIAGNOSIS — R01.1 CARDIAC MURMUR, UNSPECIFIED: ICD-10-CM

## 2019-02-15 DIAGNOSIS — Z98.89 OTHER SPECIFIED POSTPROCEDURAL STATES: Chronic | ICD-10-CM

## 2019-02-15 DIAGNOSIS — Z00.00 ENCOUNTER FOR GENERAL ADULT MEDICAL EXAMINATION WITHOUT ABNORMAL FINDINGS: ICD-10-CM

## 2019-02-15 PROCEDURE — 77080 DXA BONE DENSITY AXIAL: CPT | Mod: 26

## 2019-02-15 PROCEDURE — 93306 TTE W/DOPPLER COMPLETE: CPT | Mod: 26

## 2019-02-15 PROCEDURE — 77080 DXA BONE DENSITY AXIAL: CPT

## 2019-03-25 PROCEDURE — 99213 OFFICE O/P EST LOW 20 MIN: CPT

## 2019-05-24 ENCOUNTER — APPOINTMENT (OUTPATIENT)
Dept: INTERNAL MEDICINE | Facility: CLINIC | Age: 74
End: 2019-05-24
Payer: MEDICARE

## 2019-05-24 ENCOUNTER — OUTPATIENT (OUTPATIENT)
Dept: OUTPATIENT SERVICES | Facility: HOSPITAL | Age: 74
LOS: 1 days | End: 2019-05-24

## 2019-05-24 VITALS — HEIGHT: 64 IN | BODY MASS INDEX: 23.22 KG/M2 | WEIGHT: 136 LBS

## 2019-05-24 VITALS — SYSTOLIC BLOOD PRESSURE: 178 MMHG | HEART RATE: 48 BPM | DIASTOLIC BLOOD PRESSURE: 82 MMHG

## 2019-05-24 DIAGNOSIS — Z98.49 CATARACT EXTRACTION STATUS, UNSPECIFIED EYE: Chronic | ICD-10-CM

## 2019-05-24 DIAGNOSIS — Z98.89 OTHER SPECIFIED POSTPROCEDURAL STATES: Chronic | ICD-10-CM

## 2019-05-24 PROCEDURE — 99213 OFFICE O/P EST LOW 20 MIN: CPT | Mod: GE

## 2019-05-29 DIAGNOSIS — F29 UNSPECIFIED PSYCHOSIS NOT DUE TO A SUBSTANCE OR KNOWN PHYSIOLOGICAL CONDITION: ICD-10-CM

## 2019-05-29 DIAGNOSIS — I10 ESSENTIAL (PRIMARY) HYPERTENSION: ICD-10-CM

## 2019-05-29 DIAGNOSIS — R29.898 OTHER SYMPTOMS AND SIGNS INVOLVING THE MUSCULOSKELETAL SYSTEM: ICD-10-CM

## 2019-05-29 DIAGNOSIS — M85.80 OTHER SPECIFIED DISORDERS OF BONE DENSITY AND STRUCTURE, UNSPECIFIED SITE: ICD-10-CM

## 2019-05-29 RX ORDER — AMLODIPINE BESYLATE 5 MG/1
5 TABLET ORAL
Qty: 90 | Refills: 3 | Status: DISCONTINUED | COMMUNITY
Start: 2019-02-06 | End: 2019-05-29

## 2019-05-29 NOTE — PLAN
[FreeTextEntry1] : The patient is a 74 year old female with HTN, Psychosis, and Bradycardia here for a follow-up visit.\par \par 1) Back weakness - will refer to physical therapy.\par \par 2) Hypertension - poorly controlled. \par - Will increase amlodipine from 5 mg daily to 10 mg po daily. \par - Will add chlorthalidone 25 mg po daily.\par - will assess for improvement at next visit.\par \par 3) Psychosis - Continue with Risperidone 0.5 mg po qhs.\par \par 4) HCM - Completed DEXA scan in 02/2019 which showed osteopenia that was present before. Up-to-date on vaccinations and screening tests.\par \par RTC in 5 weeks.\par \par Patient discussed with Dr. Diaz.\par All risks and benefits were discussed with patient. \par Patient and attending agreed with the above assessment and plan.\par \par Precious Montejo M.D. PGY1\par Internal Medicine\par [facility]\par [phone]\par \par \par

## 2019-05-29 NOTE — PHYSICAL EXAM
[Well Nourished] : well nourished [No Acute Distress] : no acute distress [Well-Appearing] : well-appearing [Normal Sclera/Conjunctiva] : normal sclera/conjunctiva [Well Developed] : well developed [PERRL] : pupils equal round and reactive to light [EOMI] : extraocular movements intact [Normal Oropharynx] : the oropharynx was normal [Normal Outer Ear/Nose] : the outer ears and nose were normal in appearance [No JVD] : no jugular venous distention [No Lymphadenopathy] : no lymphadenopathy [Supple] : supple [No Accessory Muscle Use] : no accessory muscle use [Clear to Auscultation] : lungs were clear to auscultation bilaterally [No Respiratory Distress] : no respiratory distress  [Normal S1, S2] : normal S1 and S2 [Regular Rhythm] : with a regular rhythm [Normal Rate] : normal rate  [Pedal Pulses Present] : the pedal pulses are present [No Edema] : there was no peripheral edema [Soft] : abdomen soft [No Masses] : no abdominal mass palpated [Non-distended] : non-distended [Non Tender] : non-tender [Normal Bowel Sounds] : normal bowel sounds [No CVA Tenderness] : no CVA  tenderness [No HSM] : no HSM [Grossly Normal Strength/Tone] : grossly normal strength/tone [No Spinal Tenderness] : no spinal tenderness [Coordination Grossly Intact] : coordination grossly intact [Normal Gait] : normal gait [No Focal Deficits] : no focal deficits [Normal Affect] : the affect was normal [Normal Insight/Judgement] : insight and judgment were intact [de-identified] : Elderly [de-identified] : systolic ejection murmur

## 2019-05-29 NOTE — HISTORY OF PRESENT ILLNESS
[FreeTextEntry1] : The patient is here for follow up.  [de-identified] : The patient is a 74 year old female with HTN, Psychosis, and Bradycardia here for a follow-up visit.\par \par 1) Back weakness - the patient has been experiencing back weakness over the last few weeks. She is not experiencing pain. She cannot lift heavy objects. When she is gardening or doing household chores she feels that she cannot last more than ten minutes. She has to sit or lay down to feel better. She used to do the [employer] exercise program, but it is no longer covered by insurance. So, she has not formally exercised in 1.5 years. She walks around the block twice a week.\par \par 2) Hypertension - the patient has been monitoring her blood pressure daily with a home blood pressure cuff. The blood pressure readings range systolically from 120-147 mm Hg. Her diastolic pressure remains in the 80s. She does not eat foods high in salt or sugar. \par \par 3) Psychosis - the patient denies auditory or visual hallucinations. She has no complaints.

## 2019-06-13 PROCEDURE — 99213 OFFICE O/P EST LOW 20 MIN: CPT

## 2019-06-28 ENCOUNTER — APPOINTMENT (OUTPATIENT)
Dept: INTERNAL MEDICINE | Facility: CLINIC | Age: 74
End: 2019-06-28
Payer: MEDICARE

## 2019-06-28 ENCOUNTER — LABORATORY RESULT (OUTPATIENT)
Age: 74
End: 2019-06-28

## 2019-06-28 ENCOUNTER — OUTPATIENT (OUTPATIENT)
Dept: OUTPATIENT SERVICES | Facility: HOSPITAL | Age: 74
LOS: 1 days | End: 2019-06-28

## 2019-06-28 VITALS — DIASTOLIC BLOOD PRESSURE: 64 MMHG | SYSTOLIC BLOOD PRESSURE: 132 MMHG

## 2019-06-28 VITALS — BODY MASS INDEX: 23.05 KG/M2 | WEIGHT: 135 LBS | HEART RATE: 51 BPM | OXYGEN SATURATION: 98 % | HEIGHT: 64 IN

## 2019-06-28 DIAGNOSIS — I10 ESSENTIAL (PRIMARY) HYPERTENSION: ICD-10-CM

## 2019-06-28 DIAGNOSIS — F29 UNSPECIFIED PSYCHOSIS NOT DUE TO A SUBSTANCE OR KNOWN PHYSIOLOGICAL CONDITION: ICD-10-CM

## 2019-06-28 DIAGNOSIS — Z98.89 OTHER SPECIFIED POSTPROCEDURAL STATES: Chronic | ICD-10-CM

## 2019-06-28 DIAGNOSIS — Z98.49 CATARACT EXTRACTION STATUS, UNSPECIFIED EYE: Chronic | ICD-10-CM

## 2019-06-28 DIAGNOSIS — R29.898 OTHER SYMPTOMS AND SIGNS INVOLVING THE MUSCULOSKELETAL SYSTEM: ICD-10-CM

## 2019-06-28 LAB
ANION GAP SERPL CALC-SCNC: 13 MMO/L — SIGNIFICANT CHANGE UP (ref 7–14)
BUN SERPL-MCNC: 16 MG/DL — SIGNIFICANT CHANGE UP (ref 7–23)
CALCIUM SERPL-MCNC: 10.4 MG/DL — SIGNIFICANT CHANGE UP (ref 8.4–10.5)
CHLORIDE SERPL-SCNC: 97 MMOL/L — LOW (ref 98–107)
CO2 SERPL-SCNC: 30 MMOL/L — SIGNIFICANT CHANGE UP (ref 22–31)
CREAT SERPL-MCNC: 0.87 MG/DL — SIGNIFICANT CHANGE UP (ref 0.5–1.3)
GLUCOSE SERPL-MCNC: 83 MG/DL — SIGNIFICANT CHANGE UP (ref 70–99)
POTASSIUM SERPL-MCNC: 3.7 MMOL/L — SIGNIFICANT CHANGE UP (ref 3.5–5.3)
POTASSIUM SERPL-SCNC: 3.7 MMOL/L — SIGNIFICANT CHANGE UP (ref 3.5–5.3)
SODIUM SERPL-SCNC: 140 MMOL/L — SIGNIFICANT CHANGE UP (ref 135–145)

## 2019-06-28 PROCEDURE — 99214 OFFICE O/P EST MOD 30 MIN: CPT | Mod: GC

## 2019-06-28 NOTE — PHYSICAL EXAM
[No Acute Distress] : no acute distress [Well Nourished] : well nourished [Well Developed] : well developed [Well-Appearing] : well-appearing [PERRL] : pupils equal round and reactive to light [Normal Sclera/Conjunctiva] : normal sclera/conjunctiva [EOMI] : extraocular movements intact [Normal Oropharynx] : the oropharynx was normal [Normal Outer Ear/Nose] : the outer ears and nose were normal in appearance [No JVD] : no jugular venous distention [Supple] : supple [No Lymphadenopathy] : no lymphadenopathy [Thyroid Normal, No Nodules] : the thyroid was normal and there were no nodules present [Clear to Auscultation] : lungs were clear to auscultation bilaterally [No Respiratory Distress] : no respiratory distress  [No Accessory Muscle Use] : no accessory muscle use [Normal Rate] : normal rate  [Normal S1, S2] : normal S1 and S2 [Regular Rhythm] : with a regular rhythm [No Varicosities] : no varicosities [No Abdominal Bruit] : a ~M bruit was not heard ~T in the abdomen [No Carotid Bruits] : no carotid bruits [Pedal Pulses Present] : the pedal pulses are present [No Extremity Clubbing/Cyanosis] : no extremity clubbing/cyanosis [No Edema] : there was no peripheral edema [Soft] : abdomen soft [No Palpable Aorta] : no palpable aorta [No HSM] : no HSM [No Masses] : no abdominal mass palpated [Non Tender] : non-tender [Non-distended] : non-distended [Normal Posterior Cervical Nodes] : no posterior cervical lymphadenopathy [Normal Anterior Cervical Nodes] : no anterior cervical lymphadenopathy [Normal Bowel Sounds] : normal bowel sounds [No Joint Swelling] : no joint swelling [No CVA Tenderness] : no CVA  tenderness [No Spinal Tenderness] : no spinal tenderness [Grossly Normal Strength/Tone] : grossly normal strength/tone [Normal Gait] : normal gait [No Rash] : no rash [Coordination Grossly Intact] : coordination grossly intact [No Focal Deficits] : no focal deficits [Deep Tendon Reflexes (DTR)] : deep tendon reflexes were 2+ and symmetric [Normal Affect] : the affect was normal [Normal Insight/Judgement] : insight and judgment were intact [de-identified] : systolic ejection murmur

## 2019-06-28 NOTE — PLAN
[FreeTextEntry1] : The patient is a 74 year old female with HTN, Psychosis, and Bradycardia here for a follow-up visit.\par \par 1) Back weakness - Continue with physical therapy.\par \par 2) Hypertension - Controlled. Continue with amlodipine 10 mg po daily and chlorthalidone 25 mg po daily.\par \par 3) Psychosis - Continue with Risperidone 0.5 mg po qhs. Follows with Psychiatry every three months.\par \par 4) HCM - Up-to-date on vaccinations and screening tests.\par \par RTC in 6 months.\par Patient discussed with Dr. Ya.\par All risks and benefits were discussed with patient. \par Patient and attending agreed with the above assessment and plan.\par \par Precious Montejo M.D. PGY2\par Internal Medicine\par [facility]\par [phone]

## 2019-06-28 NOTE — HISTORY OF PRESENT ILLNESS
[FreeTextEntry1] : The patient is here for follow up. [de-identified] : The patient is a 74 year old female with HTN, Psychosis, and Bradycardia here for a follow-up visit.\par \par 1) Back weakness - the patient has completed 4-6 sessions of physical therapy. She feels an improvement, but wants to complete more sessions. She also does the exercises at home. She is pleased with the therapy sessions and will continue.\par \par 2) Hypertension - the patient was started on chlorthalidone after her last visit. Her dose of amlodipine was also increased. She feels that she urinates more, but it is manageable. She monitors her blood pressure at home and readings are 100s-130/50-80 mm Hg.  She denies chest pain, SOB, palpitations or lower extremity swelling.\par \par 3) Psychosis - the patient denies auditory or visual hallucinations. She has no complaints. She follows with Psychiatry regularly.

## 2019-07-26 ENCOUNTER — RX RENEWAL (OUTPATIENT)
Age: 74
End: 2019-07-26

## 2019-09-12 PROCEDURE — 99213 OFFICE O/P EST LOW 20 MIN: CPT

## 2019-11-25 ENCOUNTER — APPOINTMENT (OUTPATIENT)
Dept: SURGICAL ONCOLOGY | Facility: CLINIC | Age: 74
End: 2019-11-25
Payer: MEDICARE

## 2019-11-25 VITALS
DIASTOLIC BLOOD PRESSURE: 85 MMHG | SYSTOLIC BLOOD PRESSURE: 194 MMHG | HEART RATE: 64 BPM | WEIGHT: 130 LBS | RESPIRATION RATE: 15 BRPM | HEIGHT: 64 IN | BODY MASS INDEX: 22.2 KG/M2

## 2019-11-25 PROCEDURE — 99204 OFFICE O/P NEW MOD 45 MIN: CPT

## 2019-12-04 ENCOUNTER — APPOINTMENT (OUTPATIENT)
Dept: VASCULAR SURGERY | Facility: CLINIC | Age: 74
End: 2019-12-04
Payer: MEDICARE

## 2019-12-04 PROCEDURE — 93923 UPR/LXTR ART STDY 3+ LVLS: CPT

## 2019-12-06 ENCOUNTER — RESULT REVIEW (OUTPATIENT)
Age: 74
End: 2019-12-06

## 2019-12-06 ENCOUNTER — OUTPATIENT (OUTPATIENT)
Dept: OUTPATIENT SERVICES | Facility: HOSPITAL | Age: 74
LOS: 1 days | End: 2019-12-06
Payer: MEDICARE

## 2019-12-06 DIAGNOSIS — C43.9 MALIGNANT MELANOMA OF SKIN, UNSPECIFIED: ICD-10-CM

## 2019-12-06 DIAGNOSIS — Z98.89 OTHER SPECIFIED POSTPROCEDURAL STATES: Chronic | ICD-10-CM

## 2019-12-06 DIAGNOSIS — Z98.49 CATARACT EXTRACTION STATUS, UNSPECIFIED EYE: Chronic | ICD-10-CM

## 2019-12-06 PROCEDURE — 88321 CONSLTJ&REPRT SLD PREP ELSWR: CPT

## 2019-12-17 LAB — SURGICAL PATHOLOGY STUDY: SIGNIFICANT CHANGE UP

## 2019-12-20 ENCOUNTER — LABORATORY RESULT (OUTPATIENT)
Age: 74
End: 2019-12-20

## 2019-12-20 ENCOUNTER — OUTPATIENT (OUTPATIENT)
Dept: OUTPATIENT SERVICES | Facility: HOSPITAL | Age: 74
LOS: 1 days | End: 2019-12-20

## 2019-12-20 ENCOUNTER — APPOINTMENT (OUTPATIENT)
Dept: INTERNAL MEDICINE | Facility: CLINIC | Age: 74
End: 2019-12-20
Payer: MEDICARE

## 2019-12-20 VITALS
DIASTOLIC BLOOD PRESSURE: 80 MMHG | SYSTOLIC BLOOD PRESSURE: 146 MMHG | BODY MASS INDEX: 22.2 KG/M2 | HEIGHT: 64 IN | HEART RATE: 75 BPM | WEIGHT: 130 LBS

## 2019-12-20 DIAGNOSIS — Z98.89 OTHER SPECIFIED POSTPROCEDURAL STATES: Chronic | ICD-10-CM

## 2019-12-20 DIAGNOSIS — Z98.49 CATARACT EXTRACTION STATUS, UNSPECIFIED EYE: Chronic | ICD-10-CM

## 2019-12-20 LAB
ALBUMIN SERPL ELPH-MCNC: 4.7 G/DL — SIGNIFICANT CHANGE UP (ref 3.3–5)
ALP SERPL-CCNC: 74 U/L — SIGNIFICANT CHANGE UP (ref 40–120)
ALT FLD-CCNC: 11 U/L — SIGNIFICANT CHANGE UP (ref 4–33)
ANION GAP SERPL CALC-SCNC: 12 MMO/L — SIGNIFICANT CHANGE UP (ref 7–14)
APTT BLD: 31.1 SEC — SIGNIFICANT CHANGE UP (ref 27.5–36.3)
AST SERPL-CCNC: 26 U/L — SIGNIFICANT CHANGE UP (ref 4–32)
BASOPHILS # BLD AUTO: 0.02 K/UL — SIGNIFICANT CHANGE UP (ref 0–0.2)
BASOPHILS NFR BLD AUTO: 0.3 % — SIGNIFICANT CHANGE UP (ref 0–2)
BILIRUB SERPL-MCNC: 0.3 MG/DL — SIGNIFICANT CHANGE UP (ref 0.2–1.2)
BUN SERPL-MCNC: 10 MG/DL — SIGNIFICANT CHANGE UP (ref 7–23)
CALCIUM SERPL-MCNC: 10.6 MG/DL — HIGH (ref 8.4–10.5)
CHLORIDE SERPL-SCNC: 99 MMOL/L — SIGNIFICANT CHANGE UP (ref 98–107)
CO2 SERPL-SCNC: 29 MMOL/L — SIGNIFICANT CHANGE UP (ref 22–31)
CREAT SERPL-MCNC: 0.86 MG/DL — SIGNIFICANT CHANGE UP (ref 0.5–1.3)
EOSINOPHIL # BLD AUTO: 0.02 K/UL — SIGNIFICANT CHANGE UP (ref 0–0.5)
EOSINOPHIL NFR BLD AUTO: 0.3 % — SIGNIFICANT CHANGE UP (ref 0–6)
GLUCOSE SERPL-MCNC: 82 MG/DL — SIGNIFICANT CHANGE UP (ref 70–99)
HCT VFR BLD CALC: 39.5 % — SIGNIFICANT CHANGE UP (ref 34.5–45)
HGB BLD-MCNC: 12.2 G/DL — SIGNIFICANT CHANGE UP (ref 11.5–15.5)
IMM GRANULOCYTES NFR BLD AUTO: 0.2 % — SIGNIFICANT CHANGE UP (ref 0–1.5)
LYMPHOCYTES # BLD AUTO: 1.04 K/UL — SIGNIFICANT CHANGE UP (ref 1–3.3)
LYMPHOCYTES # BLD AUTO: 16.7 % — SIGNIFICANT CHANGE UP (ref 13–44)
MCHC RBC-ENTMCNC: 28.2 PG — SIGNIFICANT CHANGE UP (ref 27–34)
MCHC RBC-ENTMCNC: 30.9 % — LOW (ref 32–36)
MCV RBC AUTO: 91.2 FL — SIGNIFICANT CHANGE UP (ref 80–100)
MONOCYTES # BLD AUTO: 0.6 K/UL — SIGNIFICANT CHANGE UP (ref 0–0.9)
MONOCYTES NFR BLD AUTO: 9.6 % — SIGNIFICANT CHANGE UP (ref 2–14)
NEUTROPHILS # BLD AUTO: 4.55 K/UL — SIGNIFICANT CHANGE UP (ref 1.8–7.4)
NEUTROPHILS NFR BLD AUTO: 72.9 % — SIGNIFICANT CHANGE UP (ref 43–77)
NRBC # FLD: 0 K/UL — SIGNIFICANT CHANGE UP (ref 0–0)
PLATELET # BLD AUTO: 225 K/UL — SIGNIFICANT CHANGE UP (ref 150–400)
PMV BLD: 13.4 FL — HIGH (ref 7–13)
POTASSIUM SERPL-MCNC: 4.6 MMOL/L — SIGNIFICANT CHANGE UP (ref 3.5–5.3)
POTASSIUM SERPL-SCNC: 4.6 MMOL/L — SIGNIFICANT CHANGE UP (ref 3.5–5.3)
PROT SERPL-MCNC: 7.8 G/DL — SIGNIFICANT CHANGE UP (ref 6–8.3)
RBC # BLD: 4.33 M/UL — SIGNIFICANT CHANGE UP (ref 3.8–5.2)
RBC # FLD: 13.3 % — SIGNIFICANT CHANGE UP (ref 10.3–14.5)
SODIUM SERPL-SCNC: 140 MMOL/L — SIGNIFICANT CHANGE UP (ref 135–145)
WBC # BLD: 6.24 K/UL — SIGNIFICANT CHANGE UP (ref 3.8–10.5)
WBC # FLD AUTO: 6.24 K/UL — SIGNIFICANT CHANGE UP (ref 3.8–10.5)

## 2019-12-20 PROCEDURE — 99214 OFFICE O/P EST MOD 30 MIN: CPT | Mod: GC

## 2019-12-20 NOTE — HISTORY OF PRESENT ILLNESS
[No Pertinent Cardiac History] : no history of aortic stenosis, atrial fibrillation, coronary artery disease, recent myocardial infarction, or implantable device/pacemaker [No Pertinent Pulmonary History] : no history of asthma, COPD, sleep apnea, or smoking [No Adverse Anesthesia Reaction] : no adverse anesthesia reaction in self or family member [(Patient denies any chest pain, claudication, dyspnea on exertion, orthopnea, palpitations or syncope)] : Patient denies any chest pain, claudication, dyspnea on exertion, orthopnea, palpitations or syncope [Moderate (4-6 METs)] : Moderate (4-6 METs) [Chronic Anticoagulation] : no chronic anticoagulation [Chronic Kidney Disease] : no chronic kidney disease [FreeTextEntry4] : 74 year old female with PMH of HTN, Psychosis and bradycardia who presents for pre-operative evaluation for surgical excision and plastic surgery graft procedure for 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 [Diabetes] : no diabetes [FreeTextEntry2] : 01/09/2020

## 2019-12-20 NOTE — PLAN
[FreeTextEntry1] : 74 year old female with PMH of HTN, Psychosis and bradycardia who presents for pre-operative evaluation for surgical excision and plastic surgery graft procedure for 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\par RCRI score is 0.\par Patient is low-risk for a low-risk procedure.\par She is medically optimized and cleared for the procedure.\par \par Precious Montejo MD PGY-2

## 2019-12-23 ENCOUNTER — RESULT REVIEW (OUTPATIENT)
Age: 74
End: 2019-12-23

## 2019-12-23 DIAGNOSIS — C43.70 MALIGNANT MELANOMA OF UNSPECIFIED LOWER LIMB, INCLUDING HIP: ICD-10-CM

## 2019-12-23 DIAGNOSIS — F29 UNSPECIFIED PSYCHOSIS NOT DUE TO A SUBSTANCE OR KNOWN PHYSIOLOGICAL CONDITION: ICD-10-CM

## 2019-12-23 DIAGNOSIS — Z01.818 ENCOUNTER FOR OTHER PREPROCEDURAL EXAMINATION: ICD-10-CM

## 2019-12-23 DIAGNOSIS — I10 ESSENTIAL (PRIMARY) HYPERTENSION: ICD-10-CM

## 2019-12-31 ENCOUNTER — OUTPATIENT (OUTPATIENT)
Dept: OUTPATIENT SERVICES | Facility: HOSPITAL | Age: 74
LOS: 1 days | End: 2019-12-31
Payer: MEDICARE

## 2019-12-31 VITALS
SYSTOLIC BLOOD PRESSURE: 130 MMHG | OXYGEN SATURATION: 97 % | WEIGHT: 134.04 LBS | RESPIRATION RATE: 16 BRPM | HEIGHT: 63 IN | DIASTOLIC BLOOD PRESSURE: 80 MMHG | TEMPERATURE: 98 F | HEART RATE: 50 BPM

## 2019-12-31 DIAGNOSIS — Z98.89 OTHER SPECIFIED POSTPROCEDURAL STATES: Chronic | ICD-10-CM

## 2019-12-31 DIAGNOSIS — Z98.49 CATARACT EXTRACTION STATUS, UNSPECIFIED EYE: Chronic | ICD-10-CM

## 2019-12-31 DIAGNOSIS — I10 ESSENTIAL (PRIMARY) HYPERTENSION: ICD-10-CM

## 2019-12-31 DIAGNOSIS — C43.70 MALIGNANT MELANOMA OF UNSPECIFIED LOWER LIMB, INCLUDING HIP: ICD-10-CM

## 2019-12-31 DIAGNOSIS — C43.9 MALIGNANT MELANOMA OF SKIN, UNSPECIFIED: ICD-10-CM

## 2019-12-31 NOTE — H&P PST ADULT - ATTENDING COMMENTS
74-year-old lady with a large diameter melanoma in situ involving the upper part of her right big toe, scheduled for excision, with reconstruction by Dr. Rosas.    Her diagnosis, and the plan of management, were reviewed with her in my office, and again the morning of operation.    All questions answered, consent on chart

## 2019-12-31 NOTE — H&P PST ADULT - NEGATIVE ENMT SYMPTOMS
no ear pain/no tinnitus/no vertigo/no sinus symptoms/no throat pain/no dysphagia/no hearing difficulty

## 2019-12-31 NOTE — H&P PST ADULT - HISTORY OF PRESENT ILLNESS
Pt is a 74 yr old female scheduled for Wide Excision Hallux Melanoma with Dr Child 1/9/20 - Pt is a 74 yr old female scheduled for Wide Excision Hallux Melanoma with Dr Child 1/9/20 - Pt hx bradycardia and psychosis and seen in PST with daughter - pt states she was seen by Podiatrist who noted melanoma on plantar/lateral aspect right big toe. Biopsy done and pt to have surgical removal. Pt denies pain   Pt is poor historian

## 2019-12-31 NOTE — H&P PST ADULT - ENDOCRINE COMMENTS
Hx of thyroid surgery - pt stable - TFTs done Hx of thyroid surgery - pt sates TFTs done regularly by PCP

## 2019-12-31 NOTE — H&P PST ADULT - NS PRO FEM REPRO HEALTH SCREEN
Subjective   Ivy Chairez is a 61 y.o. female.     History of Present Illness she is here today for her yearly follow-up of coronary artery disease, cerebrovascular disease, hypertension, hypothyroidism, and hypercholesterolemia.  She has been having difficulty with left anterior sharp chest pain occurring both at rest and with exertion over the last few months.  She was seen by her cardiologist and exercise stress test was done and was negative for ischemia.  She was placed on metoprolol at that time.  Due to persistence of pain she will have a Cardiolite scan within the next few weeks.  The pain radiates from the left upper anterior chest and left mid parasternal area to the left scapula.  It is stabbing in nature and lasts anywhere from seconds to a few minutes.  It does not awaken her from sleep.  She denies any PND, cough, wheezing, or dyspnea on exertion.  There has been no swelling in the ankles.  She denies any dizziness, syncope, or focal neurologic episodes.  She has not had any abdominal pain, melanotic stool, or rectal bleeding.  Her diarrhea has improved on Welchol.  Review systems is otherwise negative.  She had a normal mammogram last year.  Recent blood work showed low normal TSH level.        Review of Systems   Constitutional: Positive for fatigue and unexpected weight gain. Negative for activity change and appetite change.   HENT: Negative for trouble swallowing.    Respiratory: Negative for chest tightness, shortness of breath and wheezing.    Cardiovascular: Positive for chest pain. Negative for palpitations and leg swelling.   Gastrointestinal: Negative for abdominal pain, anal bleeding, blood in stool, constipation, nausea and vomiting.   Genitourinary: Negative for dysuria, flank pain, frequency and hematuria.   Neurological: Negative for dizziness, syncope, facial asymmetry, speech difficulty, weakness, numbness and headaches.   Psychiatric/Behavioral: Negative for depressed mood. The  patient is not nervous/anxious.        Objective   Physical Exam   Constitutional: She is oriented to person, place, and time. Vital signs are normal. She appears well-developed and well-nourished. She is active. She does not appear ill.   Eyes: Conjunctivae are normal.   Fundoscopic exam:       The right eye shows no AV nicking, no exudate and no hemorrhage.        The left eye shows no AV nicking, no exudate and no hemorrhage.   Neck: Carotid bruit is not present. No thyroid mass and no thyromegaly present.   Cardiovascular: Normal rate, regular rhythm, S1 normal and S2 normal.  Exam reveals no S3 and no S4.    Pulses:       Posterior tibial pulses are 2+ on the right side, and 2+ on the left side.   Pulmonary/Chest: No tachypnea. No respiratory distress. She has no decreased breath sounds. She has no wheezes. She has no rhonchi. She has no rales.   Abdominal: Soft. Normal appearance and bowel sounds are normal. She exhibits no abdominal bruit and no mass. There is no hepatosplenomegaly. There is no tenderness.     Vascular Status -  Her right foot exhibits normal right foot edema. Her left foot exhibits normal left foot edema.  Neurological: She is alert and oriented to person, place, and time. Gait normal.   Reflex Scores:       Bicep reflexes are 2+ on the right side.  Psychiatric: She has a normal mood and affect. Her speech is normal and behavior is normal. Judgment and thought content normal. Cognition and memory are normal.         Assessment/Plan blood pressure by me today 116/72    Assessment #1 hypertension-good control #2 coronary artery disease-I really do not feel that her present chest pain is coronary insufficiency and I did discuss this with her #3 hypothyroidism-clinically euthyroid and even if she were hypothyroid increasing her supplemental dose would not be done until coronary artery status has been clarified further #4 cerebral vascular disease-without recurrent symptoms.  All of this was  discussed with her at some length.    Plan #1 no change medication #2 CBC, CMP, free T3 level today.  #3 follow-up with cardiology scheduled for later this month.  Routine follow-up with me in 6 months.              mammogram

## 2019-12-31 NOTE — H&P PST ADULT - NSICDXPASTMEDICALHX_GEN_ALL_CORE_FT
PAST MEDICAL HISTORY:  Bradycardia h/o Bradycardia for many years - Asymptomatic    Diastolic murmur     HTN (hypertension)     Mood disorder h/o agitation, anxiety 4 yrs ago    Osteopenia     SBO (small bowel obstruction) 2010 2012    Sebaceous cyst     White coat hypertension PAST MEDICAL HISTORY:  Bradycardia h/o Bradycardia for many years - Asymptomatic    Diastolic murmur     HTN (hypertension)     Melanoma     Mood disorder h/o agitation, anxiety 4 yrs ago    Osteopenia     SBO (small bowel obstruction) 2010 2012    Sebaceous cyst     White coat hypertension

## 2019-12-31 NOTE — H&P PST ADULT - NSICDXPROBLEM_GEN_ALL_CORE_FT
PROBLEM DIAGNOSES  Problem: Melanoma  Assessment and Plan: Pt scheduled for surgery and preop instructions including instructions for taking Famotidine and for Chlorhexidine use in showering on the day of surgery, given verbally and with use of  written materials, and patient confirming understanding of such instructions using  teach back   method.  OR Booking notified of risk for sleep apnea and allergy to ASA     Problem: HTN (hypertension)  Assessment and Plan: Pt to take Amlodipine am DOS

## 2019-12-31 NOTE — H&P PST ADULT - NSICDXPASTSURGICALHX_GEN_ALL_CORE_FT
PAST SURGICAL HISTORY:  H/O exploratory laparotomy     S/P hysterectomy 1988 - Fibroid uterus    Status post cataract extraction right eye done on 11/19/2016    Status post small bowel resection 2010 exlap solomon sbr for closed loop obstruction    Thyroid nodule Thyroid nodule removed - 1972

## 2020-01-08 ENCOUNTER — TRANSCRIPTION ENCOUNTER (OUTPATIENT)
Age: 75
End: 2020-01-08

## 2020-01-08 NOTE — ASU DISCHARGE PLAN (ADULT/PEDIATRIC) - PROVIDER TOKENS
PROVIDER:[TOKEN:[[id number]],FOLLOWUP:[2 weeks]] PROVIDER:[TOKEN:[[id number]],FOLLOWUP:[2 weeks]],PROVIDER:[TOKEN:[[id number]]]

## 2020-01-08 NOTE — ASU DISCHARGE PLAN (ADULT/PEDIATRIC) - SPECIFY DIET AND FLUID
Progress to regular diet as tolerated.  Keep well hydrated. Keep first meal light. Nothing fried, spicy or greasy. Increase fluids.

## 2020-01-08 NOTE — ASU DISCHARGE PLAN (ADULT/PEDIATRIC) - ASU DC SPECIAL INSTRUCTIONSFT
Keep dressing on and in place until your follow up visit  Do not get the dressing wet please  Keep right foot in splint and avoid weight bearing for 2 weeks  Elevated the RLE while at home with 3-4 pillows please  No strenuous activity or heavy lifting Keep dressing on and in place until your follow up visit  Do not get the dressing wet please  Keep right foot in splint and avoid weight bearing for 2 weeks  Elevated the RLE while at home with 3-4 pillows please  No strenuous activity or heavy lifting    Initial followup with plastic surgery.    Dr. Child should call with pathology report in approximately 2 weeks, the conversation will determine further management

## 2020-01-08 NOTE — ASU DISCHARGE PLAN (ADULT/PEDIATRIC) - PROCEDURE
Wide Local Excision of Melanoma in-situ by Dr. Child of the Right Big toe with FTSG Reconstruction by Dr. Rosas

## 2020-01-08 NOTE — ASU DISCHARGE PLAN (ADULT/PEDIATRIC) - CARE PROVIDER_API CALL
Jabier Rosas (MD)  Surgery  [address] [place] [address]  Phone: [phone]  Fax: [phone]  Follow Up Time: 2 weeks Jabier Rosas)  Surgery  [address] [place] [address]  Phone: [phone]  Fax: [phone]  Follow Up Time: 2 weeks    Jonathan Child)  Surgery  [address] [place] [address]  Phone: [phone]  Fax: [phone]  Follow Up Time:

## 2020-01-08 NOTE — ASU DISCHARGE PLAN (ADULT/PEDIATRIC) - FOLLOW UP APPOINTMENTS
911 or go to the nearest Emergency Room [facility] ([facility]):

## 2020-01-08 NOTE — ASU DISCHARGE PLAN (ADULT/PEDIATRIC) - DO NOT SUBMERGE DURATION DAY(S)
Do not get the right foot wet for 10-14 days please. Keep dressing on and dry and in place. Keep Foot SPlint on Do not get the right foot wet for 10-14 days please. Keep dressing on and dry and in place. Keep Foot Splint on

## 2020-01-08 NOTE — ASU DISCHARGE PLAN (ADULT/PEDIATRIC) - PAIN MANAGEMENT
Prescription given to patient/guardian No Tylenol until 5:30PM/Prescription given to patient/guardian

## 2020-01-09 ENCOUNTER — RESULT REVIEW (OUTPATIENT)
Age: 75
End: 2020-01-09

## 2020-01-09 ENCOUNTER — APPOINTMENT (OUTPATIENT)
Dept: SURGICAL ONCOLOGY | Facility: AMBULATORY SURGERY CENTER | Age: 75
End: 2020-01-09

## 2020-01-09 ENCOUNTER — OUTPATIENT (OUTPATIENT)
Dept: OUTPATIENT SERVICES | Facility: HOSPITAL | Age: 75
LOS: 1 days | Discharge: HOME | End: 2020-01-09
Payer: MEDICARE

## 2020-01-09 VITALS
SYSTOLIC BLOOD PRESSURE: 148 MMHG | OXYGEN SATURATION: 100 % | TEMPERATURE: 98 F | DIASTOLIC BLOOD PRESSURE: 67 MMHG | RESPIRATION RATE: 16 BRPM | HEART RATE: 58 BPM | WEIGHT: 134.04 LBS | HEIGHT: 63 IN

## 2020-01-09 VITALS
HEART RATE: 52 BPM | TEMPERATURE: 98 F | RESPIRATION RATE: 18 BRPM | DIASTOLIC BLOOD PRESSURE: 62 MMHG | SYSTOLIC BLOOD PRESSURE: 130 MMHG | OXYGEN SATURATION: 100 %

## 2020-01-09 DIAGNOSIS — Z98.49 CATARACT EXTRACTION STATUS, UNSPECIFIED EYE: Chronic | ICD-10-CM

## 2020-01-09 DIAGNOSIS — D03.71 MELANOMA IN SITU OF RIGHT LOWER LIMB, INCLUDING HIP: ICD-10-CM

## 2020-01-09 DIAGNOSIS — Z98.89 OTHER SPECIFIED POSTPROCEDURAL STATES: Chronic | ICD-10-CM

## 2020-01-09 DIAGNOSIS — C43.70 MALIGNANT MELANOMA OF UNSPECIFIED LOWER LIMB, INCLUDING HIP: ICD-10-CM

## 2020-01-09 PROCEDURE — 88341 IMHCHEM/IMCYTCHM EA ADD ANTB: CPT | Mod: 26

## 2020-01-09 PROCEDURE — 88342 IMHCHEM/IMCYTCHM 1ST ANTB: CPT | Mod: 26

## 2020-01-09 PROCEDURE — 88305 TISSUE EXAM BY PATHOLOGIST: CPT | Mod: 26

## 2020-01-09 PROCEDURE — 11624 EXC S/N/H/F/G MAL+MRG 3.1-4: CPT

## 2020-01-09 NOTE — CHART NOTE - NSCHARTNOTEFT_GEN_A_CORE
Ms. 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    The lesion was first noted by her podiatrist and measures 2.5 x 2cm on the lateral aspect of the right first big toe, and biopsy showed melanoma in situ. The patient will undergo wide local excision of the lesion with 5mm margins, as is standard practice. There is some data to suggest that extending the margins to 9mm can lead to a 98.9% negative margin rate, however that may be difficult in this location. A decision can be made intra-operatively regarding possibility of extending margins, however it is not necessary. PRS will perform an immediate reconstruction, likely with a FTSG at this time.

## 2020-01-09 NOTE — BRIEF OPERATIVE NOTE - OPERATION/FINDINGS
~2.5 cm diameter melanoma in situ of the outer aspect of the right toe size with a 5 mm margin, with immediate reconstruction

## 2020-01-17 LAB — SURGICAL PATHOLOGY STUDY: SIGNIFICANT CHANGE UP

## 2020-02-27 PROCEDURE — 99212 OFFICE O/P EST SF 10 MIN: CPT

## 2020-03-12 ENCOUNTER — NON-APPOINTMENT (OUTPATIENT)
Age: 75
End: 2020-03-12

## 2020-03-12 ENCOUNTER — APPOINTMENT (OUTPATIENT)
Dept: OPHTHALMOLOGY | Facility: CLINIC | Age: 75
End: 2020-03-12
Payer: MEDICARE

## 2020-03-12 PROCEDURE — 92014 COMPRE OPH EXAM EST PT 1/>: CPT

## 2020-03-30 ENCOUNTER — APPOINTMENT (OUTPATIENT)
Dept: INTERNAL MEDICINE | Facility: CLINIC | Age: 75
End: 2020-03-30

## 2020-03-31 PROBLEM — C43.9 MALIGNANT MELANOMA OF SKIN, UNSPECIFIED: Chronic | Status: ACTIVE | Noted: 2019-12-31

## 2020-04-28 NOTE — ASSESSMENT
[FreeTextEntry1] : We had a discussion regarding the recently diagnosed melanoma of the plantar/lateral aspect of the right hallux and the need for appropriate excision which would be performed as an ambulatory procedure in conjunction with plastic surgery.\par \par I given her a prescription for arterial Doppler studies to be done by Dr. Barker\par \par Reviewed in detail, all questions answered.\par \par \par (01-22-20.\par We spoke.\par January 9, 2020, she underwent wide excision of a large diameter melanoma in situ from the plantar aspect of the outer portion of the right hallux, with reconstruction by Dr. Rosas.\par There were additional areas of hyperpigmentation in proximity to the lesion that were biopsied.\par Final pathology:\par 1.  Negative margins of resection (lateral margin, which anatomically was a nail fold, is proximate, <0.2 mm).\par 2.  No invasive component.\par 3.  Additional biopsies of peripheral pigmentation were not malignant.\par My office will call to schedule a postoperative visit.\par Note dictated to the referring physicians.)\par \par \par

## 2020-04-28 NOTE — REVIEW OF SYSTEMS
[Negative] : Heme/Lymph [FreeTextEntry5] : Hypertension [de-identified] : Melanoma [de-identified] : benign thyroid disease [de-identified] : Psychoses

## 2020-04-28 NOTE — PHYSICAL EXAM
[Normal] : supple, no neck mass and thyroid not enlarged [Normal Supraclavicular Lymph Nodes] : normal supraclavicular lymph nodes [Normal Neck Lymph Nodes] : normal neck lymph nodes  [Normal Axillary Lymph Nodes] : normal axillary lymph nodes [Normal Groin Lymph Nodes] : normal groin lymph nodes [Normal] : full range of motion and no deformities appreciated [de-identified] : Below

## 2020-04-28 NOTE — HISTORY OF PRESENT ILLNESS
[de-identified] : 74-related referred by her podiatrist Dr. John RANDHAWA with a melanoma in situ of the plantar/lateral aspect of the RIGHT HALLUX.\par \par This is an asymptomatic pigmented lesion of unclear duration noticed by her podiatrist when she went for foot care\par \par 2018 she was seen in our dermatology department for an epidermal inclusion cyst of the right posterior shoulder.\par She did not have a dermatologic survey at that time\par \par \par PMD: Dr. Cody HAQUE\par The resident physician she sees is Dr. Precious Montejo\par \par No pacemaker or defibrillator.\par No anticoagulants\par \par PMH: Hypertension treated with amlodipine and chlorthalidone.\par Bradycardia.\par History of psychoses, treated with risperidone; follows up with psychiatry - Dr. Yanna SRINIVASAN\par \par She also takes vitamin D and calcium\par \par Previous thyroidectomy for benign disease, 1975\par \par She has a history of small bowel obstructions from adhesive bands from a previous hysterectomy.\par 2013 she had an exploratory laparotomy, with enterectomy.\par \par 2017 colonoscopy Dr. Natacha LANG, benign polypectomy\par \par She had cataract surgery in 2017.\par Eye examination in the fall of 2018 was unremarkable.\par \par She had a HUBER/BSO for benign disease in 1988.\par She has not seen a gynecologist since that time.\par \par Mammography in the fall of 2018 was unremarkable\par

## 2020-04-28 NOTE — REASON FOR VISIT
[Initial Consultation] : an initial consultation for [Other: _____] : [unfilled] [FreeTextEntry2] : Melanoma in situ of right big toe

## 2020-05-08 ENCOUNTER — APPOINTMENT (OUTPATIENT)
Dept: INTERNAL MEDICINE | Facility: CLINIC | Age: 75
End: 2020-05-08

## 2020-08-03 ENCOUNTER — APPOINTMENT (OUTPATIENT)
Dept: SURGICAL ONCOLOGY | Facility: CLINIC | Age: 75
End: 2020-08-03
Payer: MEDICARE

## 2020-08-03 VITALS
HEIGHT: 64 IN | RESPIRATION RATE: 15 BRPM | DIASTOLIC BLOOD PRESSURE: 78 MMHG | SYSTOLIC BLOOD PRESSURE: 161 MMHG | HEART RATE: 55 BPM | OXYGEN SATURATION: 95 % | BODY MASS INDEX: 22.71 KG/M2 | WEIGHT: 133 LBS

## 2020-08-03 PROCEDURE — 99215 OFFICE O/P EST HI 40 MIN: CPT

## 2020-08-17 ENCOUNTER — APPOINTMENT (OUTPATIENT)
Dept: INTERNAL MEDICINE | Facility: CLINIC | Age: 75
End: 2020-08-17

## 2020-08-19 ENCOUNTER — APPOINTMENT (OUTPATIENT)
Dept: INTERNAL MEDICINE | Facility: CLINIC | Age: 75
End: 2020-08-19

## 2020-08-21 ENCOUNTER — LABORATORY RESULT (OUTPATIENT)
Age: 75
End: 2020-08-21

## 2020-08-21 ENCOUNTER — APPOINTMENT (OUTPATIENT)
Dept: DERMATOLOGY | Facility: CLINIC | Age: 75
End: 2020-08-21
Payer: MEDICARE

## 2020-08-21 VITALS — WEIGHT: 130 LBS | BODY MASS INDEX: 22.2 KG/M2 | HEIGHT: 64 IN

## 2020-08-21 VITALS — TEMPERATURE: 98.2 F

## 2020-08-21 PROCEDURE — 11104 PUNCH BX SKIN SINGLE LESION: CPT | Mod: GC

## 2020-08-21 PROCEDURE — 99214 OFFICE O/P EST MOD 30 MIN: CPT | Mod: 25,GC

## 2020-08-21 PROCEDURE — 11105 PUNCH BX SKIN EA SEP/ADDL: CPT | Mod: GC

## 2020-09-21 ENCOUNTER — APPOINTMENT (OUTPATIENT)
Dept: INTERNAL MEDICINE | Facility: CLINIC | Age: 75
End: 2020-09-21

## 2020-09-22 ENCOUNTER — APPOINTMENT (OUTPATIENT)
Dept: INTERNAL MEDICINE | Facility: CLINIC | Age: 75
End: 2020-09-22
Payer: MEDICARE

## 2020-09-22 ENCOUNTER — OUTPATIENT (OUTPATIENT)
Dept: OUTPATIENT SERVICES | Facility: HOSPITAL | Age: 75
LOS: 1 days | End: 2020-09-22

## 2020-09-22 VITALS
HEIGHT: 64 IN | DIASTOLIC BLOOD PRESSURE: 90 MMHG | BODY MASS INDEX: 22.2 KG/M2 | HEART RATE: 67 BPM | SYSTOLIC BLOOD PRESSURE: 140 MMHG | WEIGHT: 130 LBS

## 2020-09-22 DIAGNOSIS — Z98.49 CATARACT EXTRACTION STATUS, UNSPECIFIED EYE: Chronic | ICD-10-CM

## 2020-09-22 DIAGNOSIS — L98.8 OTHER SPECIFIED DISORDERS OF THE SKIN AND SUBCUTANEOUS TISSUE: ICD-10-CM

## 2020-09-22 DIAGNOSIS — Z98.89 OTHER SPECIFIED POSTPROCEDURAL STATES: Chronic | ICD-10-CM

## 2020-09-22 PROCEDURE — 99443: CPT | Mod: GE,95

## 2020-09-22 RX ORDER — CHLORTHALIDONE 25 MG/1
25 TABLET ORAL DAILY
Qty: 60 | Refills: 0 | Status: DISCONTINUED | COMMUNITY
Start: 2019-05-24 | End: 2020-09-22

## 2020-09-23 ENCOUNTER — APPOINTMENT (OUTPATIENT)
Dept: DERMATOLOGY | Facility: CLINIC | Age: 75
End: 2020-09-23
Payer: MEDICARE

## 2020-09-23 VITALS — TEMPERATURE: 96.6 F

## 2020-09-23 PROCEDURE — 99214 OFFICE O/P EST MOD 30 MIN: CPT | Mod: GC

## 2020-09-24 DIAGNOSIS — I10 ESSENTIAL (PRIMARY) HYPERTENSION: ICD-10-CM

## 2020-09-24 DIAGNOSIS — L98.8 OTHER SPECIFIED DISORDERS OF THE SKIN AND SUBCUTANEOUS TISSUE: ICD-10-CM

## 2020-09-24 DIAGNOSIS — M54.5 LOW BACK PAIN: ICD-10-CM

## 2020-09-24 DIAGNOSIS — F29 UNSPECIFIED PSYCHOSIS NOT DUE TO A SUBSTANCE OR KNOWN PHYSIOLOGICAL CONDITION: ICD-10-CM

## 2020-09-24 NOTE — HISTORY OF PRESENT ILLNESS
[Home] : at home, [unfilled] , at the time of the visit. [Medical Office: ([facility])___] : at the medical office located in  [Verbal consent obtained from patient] : the patient, [unfilled] [FreeTextEntry1] : The patient is here for follow up.  [de-identified] : 75F with HTN, psychosis, bradycardia, and osteopenia here for follow up visit via telephone.\par \par 1) Back pain - the patient complains of recurrent back pain only present with physical activity such as bending, cleaning and standing. The pain disappears as soon as she sits or lays down. The pain has been occurring for two years and is not associated with trauma. Because the pain is short-lived, OTC medications such as acetaminophen are not needed. She can only do house work for an hour and then has to rest because of the pain. She tried physical therapy and there was no relief. She used a back brace in the past that provided relief, but it was either too big or too tight.\par \par 2) Lesion of right hallux - the patient was originally suspected to have melanoma in situ. After an operation was performed, Surgical Oncology suspected that the lesion was not malignant in nature. Patient was referred to Dermatology who performed a biopsy notable for lentiginous intraepidermal melanocytic proliferation. Patient will be meeting with Dermatology tomorrow to discuss treatment options, namely Mohs surgery. Patient reports that the spot has enlarged slightly in size over the last few months. She sees no other similar lesions on her body.\par \par 3) HTN - the patient takes amlodipine and has not be taking chlorthalidone for months. She measures her blood pressure regularly at -120s and DBP 50s-60s. She walks daily around the block for 30 minutes. She eats a low-salt diet with chicken, turkey, fish, fruits, vegetables and potatoes. She denies headaches or dizziness. \par \par 4) Psychosis - the patient takes risperidone regularly. She sees psychiatry every three months. She denies auditory or visual hallucinations. She reports having a good mood and feels happy.

## 2020-09-24 NOTE — PLAN
[FreeTextEntry1] : 75F with HTN, psychosis, bradycardia, and osteopenia here for follow up visit via telephone.\par \par 1) Back pain - the patient will try to get fitted for a new back brace. Patient deferred orthopedic surgery referral for now.\par - will discuss at next visit.\par - will prescribe heating pads.\par \par 2) Lesion of right hallux -  Patient will meet with Dermatology tomorrow to discuss treatment options.\par - continue to monitor.\par \par 3) HTN - the patient takes amlodipine and has not be taking chlorthalidone for months. Blood pressure is at goal.\par - continue with amlodipine 10mg daily. \par \par 4) Psychosis - follow with psychiatry regularly. Continue with risperidone 0.5 mg qhs.\par \par 5) Osteopenia - patient takes calcium and vitamin D daily OTC.\par \par 6) HCM - patient will receive influenza vaccine at her local pharmacy next week. Up-to-date on screenings. \par \par RTC in 6 months.\par \par Patient discussed with Dr. Ya.\par All risks and benefits were discussed with patient. \par Patient and attending agreed with the above assessment and plan.\par \par Precious Montejo M.D. PGY-3\par Internal Medicine\par [facility] [place]\par [phone]

## 2020-10-05 NOTE — HISTORY OF PRESENT ILLNESS
[de-identified] : 75 year-old lady who January 2020 had wide excision of a large diameter melanoma in situ from the outer RIGHT HALLUX, with negative margins, and reconstruction by Dr. Jabier ARMAS.\par There were additional, peripheral, pigmented lesions that were biopsied and were benign.\par The only proximate margin was the lateral nail bed.\par \par She has not returned since the operation.\par \par She presents today concerned about new pigmentation on the plantar aspect of the same digit (right great toe).\par Not sure of duration.\par No preceding injury.\par Asymptomatic.\par No other specific or constitutional signs or symptoms.\par \par November 2019, she was referred by her podiatrist Dr. John RANDHAWA with a melanoma in situ of the plantar/lateral aspect of the RIGHT HALLUX.\par \par This was an asymptomatic pigmented lesion of unclear duration noticed by her podiatrist when she went for foot care\par \par 2018 she was seen in our dermatology department for an epidermal inclusion cyst of the right posterior shoulder.\par She did not have a dermatologic survey at that time\par suggested followup, especially given the new pigmentation next to the resection site of her melanoma.\par \par \par PMD: Dr. Cody HAQUE\par The resident physician she sees is Dr. Precious Montejo\par \par No pacemaker or defibrillator.\par No anticoagulants\par \par PMH: Hypertension treated with amlodipine and chlorthalidone.\par Bradycardia.\par History of psychoses, treated with risperidone; follows up with psychiatry - Dr. Yanna SRINIVASAN\par \par She also takes vitamin D and calcium\par \par Previous thyroidectomy for benign disease, 1975\par \par She has a history of small bowel obstructions from adhesive bands from a previous hysterectomy.\par 2013 she had an exploratory laparotomy, with enterectomy.\par \par 2017 colonoscopy Dr. Natacha LANG, benign polypectomy\par \par She had cataract surgery in 2017.\par Eye examination in March 2020 was unremarkable -Dr. Marko Hull.\par \par She had a HUBER/BSO for benign disease in 1988.\par She has not seen a gynecologist since that time.\par \par Mammography in the fall of 2018 was unremarkable.\par She does not want to return for any follow-up studies.\par

## 2020-10-05 NOTE — ASSESSMENT
[FreeTextEntry1] : I expressed my concern, to the patient and her daughter, that the new pigmentation on the plantar aspect of the right great toe may represent more melanoma.\par \par I have suggested that she have a complete body examination at our dermatology department, which she has previously been evaluated.\par If the pigmentation on the right great toe is characterized as worrisome, tissue sampling can be performed by dermatology, or me.\par \par They will call me after they have met with dermatology.\par If no worrisome findings, or interval changes, we will see her in 4 to 6 months, sooner if needed\par \par \par 9-24-20.\par I spoke with Dr. KATI gardner from dermatology.\par She recently performed punch biopsies of pigmented lesions on the plantar aspect of the right great toe, the previously operated digit.\par Histology demonstrates lentiginous intraepidermal melanocytic proliferation, which could possibly represent melanoma in situ.\par I forwarded her pictures (preoperative) to share the extent of hyperpigmentation around and beyond the original melanoma.\par After reviewing the images she and I will speak to determine further management.\par \par \par 10-5-20.\par Patient and I spoke.\par Reviewed the recommendation from dermatology (above) that a generous excision of the new patient notation be performed.\par She would prefer that we do the procedure, rather than have it done as an office surgery.\par Paper work for surgical scheduling submitted, along with a request for a preoperative visit with us

## 2020-10-05 NOTE — PHYSICAL EXAM
[Normal] : supple, no neck mass and thyroid not enlarged [Normal Neck Lymph Nodes] : normal neck lymph nodes  [Normal Supraclavicular Lymph Nodes] : normal supraclavicular lymph nodes [Normal Axillary Lymph Nodes] : normal axillary lymph nodes [Normal Groin Lymph Nodes] : normal groin lymph nodes [Normal] : full range of motion and no deformities appreciated [de-identified] : Below

## 2020-10-05 NOTE — REASON FOR VISIT
[Follow-Up Visit] : a follow-up visit for [Other: _____] : [unfilled] [FreeTextEntry2] : Melanoma in situ, right big toe

## 2020-10-05 NOTE — REVIEW OF SYSTEMS
[Negative] : Heme/Lymph [FreeTextEntry5] : Hypertension [FreeTextEntry8] : history of bowel obstructions [de-identified] : melanoma [de-identified] : Under the care of a Psychiatrist [de-identified] : Prior thyroid lobectomy

## 2020-10-19 ENCOUNTER — APPOINTMENT (OUTPATIENT)
Dept: SURGICAL ONCOLOGY | Facility: CLINIC | Age: 75
End: 2020-10-19
Payer: MEDICARE

## 2020-10-19 VITALS
DIASTOLIC BLOOD PRESSURE: 84 MMHG | SYSTOLIC BLOOD PRESSURE: 156 MMHG | HEART RATE: 68 BPM | WEIGHT: 130 LBS | OXYGEN SATURATION: 98 % | BODY MASS INDEX: 22.2 KG/M2 | HEIGHT: 64 IN

## 2020-10-19 PROCEDURE — 99215 OFFICE O/P EST HI 40 MIN: CPT

## 2020-10-29 PROCEDURE — 99443: CPT | Mod: 95

## 2020-11-03 ENCOUNTER — OUTPATIENT (OUTPATIENT)
Dept: OUTPATIENT SERVICES | Facility: HOSPITAL | Age: 75
LOS: 1 days | End: 2020-11-03
Payer: MEDICARE

## 2020-11-03 VITALS
TEMPERATURE: 98 F | WEIGHT: 132.28 LBS | HEIGHT: 63.5 IN | SYSTOLIC BLOOD PRESSURE: 135 MMHG | HEART RATE: 60 BPM | RESPIRATION RATE: 16 BRPM | DIASTOLIC BLOOD PRESSURE: 72 MMHG

## 2020-11-03 DIAGNOSIS — C43.70 MALIGNANT MELANOMA OF UNSPECIFIED LOWER LIMB, INCLUDING HIP: ICD-10-CM

## 2020-11-03 DIAGNOSIS — Z98.89 OTHER SPECIFIED POSTPROCEDURAL STATES: Chronic | ICD-10-CM

## 2020-11-03 DIAGNOSIS — Z01.818 ENCOUNTER FOR OTHER PREPROCEDURAL EXAMINATION: ICD-10-CM

## 2020-11-03 DIAGNOSIS — Z98.49 CATARACT EXTRACTION STATUS, UNSPECIFIED EYE: Chronic | ICD-10-CM

## 2020-11-03 LAB
ANION GAP SERPL CALC-SCNC: 8 MMOL/L — SIGNIFICANT CHANGE UP (ref 5–17)
BUN SERPL-MCNC: 14 MG/DL — SIGNIFICANT CHANGE UP (ref 7–23)
CALCIUM SERPL-MCNC: 9.9 MG/DL — SIGNIFICANT CHANGE UP (ref 8.4–10.5)
CHLORIDE SERPL-SCNC: 101 MMOL/L — SIGNIFICANT CHANGE UP (ref 96–108)
CO2 SERPL-SCNC: 29 MMOL/L — SIGNIFICANT CHANGE UP (ref 22–31)
CREAT SERPL-MCNC: 1.04 MG/DL — SIGNIFICANT CHANGE UP (ref 0.5–1.3)
GLUCOSE SERPL-MCNC: 116 MG/DL — HIGH (ref 70–99)
HCT VFR BLD CALC: 37.7 % — SIGNIFICANT CHANGE UP (ref 34.5–45)
HGB BLD-MCNC: 12.1 G/DL — SIGNIFICANT CHANGE UP (ref 11.5–15.5)
MCHC RBC-ENTMCNC: 29.2 PG — SIGNIFICANT CHANGE UP (ref 27–34)
MCHC RBC-ENTMCNC: 32.1 GM/DL — SIGNIFICANT CHANGE UP (ref 32–36)
MCV RBC AUTO: 91.1 FL — SIGNIFICANT CHANGE UP (ref 80–100)
NRBC # BLD: 0 /100 WBCS — SIGNIFICANT CHANGE UP (ref 0–0)
PLATELET # BLD AUTO: 195 K/UL — SIGNIFICANT CHANGE UP (ref 150–400)
POTASSIUM SERPL-MCNC: 3.9 MMOL/L — SIGNIFICANT CHANGE UP (ref 3.5–5.3)
POTASSIUM SERPL-SCNC: 3.9 MMOL/L — SIGNIFICANT CHANGE UP (ref 3.5–5.3)
RBC # BLD: 4.14 M/UL — SIGNIFICANT CHANGE UP (ref 3.8–5.2)
RBC # FLD: 12.9 % — SIGNIFICANT CHANGE UP (ref 10.3–14.5)
SODIUM SERPL-SCNC: 138 MMOL/L — SIGNIFICANT CHANGE UP (ref 135–145)
WBC # BLD: 4.79 K/UL — SIGNIFICANT CHANGE UP (ref 3.8–10.5)
WBC # FLD AUTO: 4.79 K/UL — SIGNIFICANT CHANGE UP (ref 3.8–10.5)

## 2020-11-03 PROCEDURE — 93005 ELECTROCARDIOGRAM TRACING: CPT

## 2020-11-03 PROCEDURE — 36415 COLL VENOUS BLD VENIPUNCTURE: CPT

## 2020-11-03 PROCEDURE — 80048 BASIC METABOLIC PNL TOTAL CA: CPT

## 2020-11-03 PROCEDURE — 85027 COMPLETE CBC AUTOMATED: CPT

## 2020-11-03 PROCEDURE — 93010 ELECTROCARDIOGRAM REPORT: CPT | Mod: NC

## 2020-11-03 PROCEDURE — G0463: CPT

## 2020-11-03 NOTE — H&P PST ADULT - NSICDXPASTMEDICALHX_GEN_ALL_CORE_FT
PAST MEDICAL HISTORY:  Bradycardia h/o Bradycardia for many years - Asymptomatic    Diastolic murmur     HTN (hypertension)     Melanoma     Mood disorder h/o agitation, anxiety 4 yrs ago    Osteopenia     SBO (small bowel obstruction) 2010 2012    Sebaceous cyst     White coat hypertension

## 2020-11-03 NOTE — H&P PST ADULT - BSA (M2)
Problem: Potential for Falls  Goal: Patient will remain free of falls  Description  INTERVENTIONS:  - Assess patient frequently for physical needs  -  Identify cognitive and physical deficits and behaviors that affect risk of falls    -  Beulah fall precautions as indicated by assessment   - Educate patient/family on patient safety including physical limitations  - Instruct patient to call for assistance with activity based on assessment  - Modify environment to reduce risk of injury  - Consider OT/PT consult to assist with strengthening/mobility  Outcome: Progressing
1.63

## 2020-11-03 NOTE — H&P PST ADULT - NSICDXPROBLEM_GEN_ALL_CORE_FT
PROBLEM DIAGNOSES  Problem: Melanoma  Assessment and Plan: Pre op and Hibiclens instructions given and explained.  Avoid NSAIDs and OTC supplements.   Patient verbalized understanding  medical consult requested scheduled 11/4/20  advised to self quarantine covid19 scheduled for 11/8/20    Problem: HTN (hypertension)  Assessment and Plan: continue on the day of surgery

## 2020-11-03 NOTE — H&P PST ADULT - ATTENDING COMMENTS
75-year-old lady was previously had wide excision large diameter melanoma in situ from the right hallux, with negative margins.  She now has new, broad, abnormal pigmentation on the plantar aspect of the same digit.  She has been seen by dermatology, and their biopsies were benign, but felt to be discordant.  She scheduled for a generous excision for diagnostic purposes, with reconstruction by Dr. Rosas    Discuss with her in my office, and again the morning of operation.    All questions answered, consent on chart

## 2020-11-03 NOTE — H&P PST ADULT - NSANTHOSAYNRD_GEN_A_CORE
No. ALLAN screening performed.  STOP BANG Legend: 0-2 = LOW Risk; 3-4 = INTERMEDIATE Risk; 5-8 = HIGH Risk

## 2020-11-03 NOTE — H&P PST ADULT - HEALTH CARE MAINTENANCE
flu vaccine received 9/2020  no recent travels outside of the country or states within the last 14 days, no fever, URI, SOB or recent change /loss in smell or taste

## 2020-11-03 NOTE — H&P PST ADULT - HISTORY OF PRESENT ILLNESS
74 y/o female with hx of HTN presents to [facility] for scheduled excision melanoma from right great toe on 11/11/20. Patient reports similar procedure was done 1/2020 with normal results.  Due to continuous monitor during follow visit advised to have area rebiopsied.

## 2020-11-04 ENCOUNTER — OUTPATIENT (OUTPATIENT)
Dept: OUTPATIENT SERVICES | Facility: HOSPITAL | Age: 75
LOS: 1 days | End: 2020-11-04

## 2020-11-04 ENCOUNTER — APPOINTMENT (OUTPATIENT)
Dept: INTERNAL MEDICINE | Facility: CLINIC | Age: 75
End: 2020-11-04
Payer: MEDICARE

## 2020-11-04 VITALS
DIASTOLIC BLOOD PRESSURE: 66 MMHG | SYSTOLIC BLOOD PRESSURE: 132 MMHG | RESPIRATION RATE: 16 BRPM | HEIGHT: 64 IN | BODY MASS INDEX: 22.91 KG/M2 | WEIGHT: 134.19 LBS | HEART RATE: 60 BPM | OXYGEN SATURATION: 99 %

## 2020-11-04 VITALS — TEMPERATURE: 97.8 F

## 2020-11-04 DIAGNOSIS — Z98.49 CATARACT EXTRACTION STATUS, UNSPECIFIED EYE: Chronic | ICD-10-CM

## 2020-11-04 DIAGNOSIS — Z98.89 OTHER SPECIFIED POSTPROCEDURAL STATES: Chronic | ICD-10-CM

## 2020-11-04 DIAGNOSIS — R01.1 CARDIAC MURMUR, UNSPECIFIED: ICD-10-CM

## 2020-11-04 PROCEDURE — 99214 OFFICE O/P EST MOD 30 MIN: CPT | Mod: GC

## 2020-11-08 ENCOUNTER — APPOINTMENT (OUTPATIENT)
Dept: DISASTER EMERGENCY | Facility: CLINIC | Age: 75
End: 2020-11-08

## 2020-11-10 ENCOUNTER — TRANSCRIPTION ENCOUNTER (OUTPATIENT)
Age: 75
End: 2020-11-10

## 2020-11-10 NOTE — HISTORY OF PRESENT ILLNESS
[(Patient denies any chest pain, claudication, dyspnea on exertion, orthopnea, palpitations or syncope)] : Patient denies any chest pain, claudication, dyspnea on exertion, orthopnea, palpitations or syncope [FreeTextEntry1] : excision of melanoma R hallux [FreeTextEntry2] : 11/11/20 [FreeTextEntry3] : Dr. Mateusz Castillo [FreeTextEntry4] : 75F with HTN, psychosis, bradycardia, and osteopenia, presents for pre-op evaluation prior to R toe melanoma excision.\par \par Denies any complaints. [FreeTextEntry7] : N/A [FreeTextEntry8] : Patient able to climb stairs and walk without CP or SOB.

## 2020-11-10 NOTE — ASU DISCHARGE PLAN (ADULT/PEDIATRIC) - ASU DC SPECIAL INSTRUCTIONSFT
Keep dressing clean and dry for 10 days, do not get wet  Keep leg elevated with 3-4 pillows while laying in bed or sitting in the chair  Avoid excessive walking or weight bearing is possible  Ok to get the groin incision wet after 48hrs  No strenuous activity, heavy lifting or exercise for 10 days Keep dressing clean and dry for 10 days, do not get wet  Keep leg elevated with 3-4 pillows while laying in bed or sitting in the chair  Avoid excessive walking or weight bearing is possible  Ok to get the groin incision wet after 48hrs  No strenuous activity, heavy lifting or exercise for 10 days      Initial followup with plastic surgery in the next 5-15 days, regarding matters of bandages, activities, and showering.    Dr. Child should call with pathology report in approximately 2 weeks.  The conversation will determine further management.

## 2020-11-10 NOTE — PHYSICAL EXAM
[No Acute Distress] : no acute distress [Well Nourished] : well nourished [Normal Sclera/Conjunctiva] : normal sclera/conjunctiva [PERRL] : pupils equal round and reactive to light [EOMI] : extraocular movements intact [Normal Outer Ear/Nose] : the outer ears and nose were normal in appearance [Normal Oropharynx] : the oropharynx was normal [No JVD] : no jugular venous distention [Supple] : supple [No Respiratory Distress] : no respiratory distress  [No Accessory Muscle Use] : no accessory muscle use [Clear to Auscultation] : lungs were clear to auscultation bilaterally [Normal Rate] : normal rate  [Regular Rhythm] : with a regular rhythm [Normal S1, S2] : normal S1 and S2 [No Varicosities] : no varicosities [No Edema] : there was no peripheral edema [Soft] : abdomen soft [Non Tender] : non-tender [Non-distended] : non-distended [Normal Bowel Sounds] : normal bowel sounds [No Joint Swelling] : no joint swelling [Grossly Normal Strength/Tone] : grossly normal strength/tone [No Rash] : no rash [Coordination Grossly Intact] : coordination grossly intact [No Focal Deficits] : no focal deficits [Normal Gait] : normal gait [Normal Affect] : the affect was normal [Alert and Oriented x3] : oriented to person, place, and time [Normal Insight/Judgement] : insight and judgment were intact [de-identified] : + systolic ejection murmur [de-identified] : R toe: hyperpigment lesion of lateral to nail

## 2020-11-10 NOTE — ASSESSMENT
[Patient Optimized for Surgery] : Patient optimized for surgery [FreeTextEntry4] : No contraindication to melanoma excision under local anesthesia. Patient is low risk for a low risk procedure (Class I risk (0 points), 3.9% risk of death, MI or cardiac arrest within 30 days).\par \par Noted EKG, CBC, CMP done at outpatient prior to today's appointment. Results were wnl.\par \par RTC in 6 months. Case d/w Dr. Ya.

## 2020-11-10 NOTE — REVIEW OF SYSTEMS
[Fever] : no fever [Chills] : no chills [Fatigue] : no fatigue [Discharge] : no discharge [Hearing Loss] : no hearing loss [Sore Throat] : no sore throat [Chest Pain] : no chest pain [Palpitations] : no palpitations [Lower Ext Edema] : no lower extremity edema [Orthopnea] : no orthopnea [Shortness Of Breath] : no shortness of breath [Wheezing] : no wheezing [Cough] : no cough [Abdominal Pain] : no abdominal pain [Constipation] : no constipation [Vomiting] : no vomiting [Dysuria] : no dysuria [Frequency] : no frequency [Joint Pain] : no joint pain [Joint Stiffness] : no joint stiffness [Muscle Pain] : no muscle pain [Itching] : no itching [Hair Changes] : no hair changes [Skin Rash] : no skin rash [Headache] : no headache [Confusion] : no confusion [Depression] : no depression [Easy Bleeding] : no easy bleeding

## 2020-11-10 NOTE — ASU DISCHARGE PLAN (ADULT/PEDIATRIC) - CARE PROVIDER_API CALL
Jabier Rosas  SURGERY  [address] [place] [id number]  Phone: [phone]  Fax: [phone]  Scheduled Appointment: 11/20/2020

## 2020-11-11 ENCOUNTER — OUTPATIENT (OUTPATIENT)
Dept: OUTPATIENT SERVICES | Facility: HOSPITAL | Age: 75
LOS: 1 days | End: 2020-11-11
Payer: MEDICARE

## 2020-11-11 ENCOUNTER — APPOINTMENT (OUTPATIENT)
Dept: SURGICAL ONCOLOGY | Facility: HOSPITAL | Age: 75
End: 2020-11-11

## 2020-11-11 ENCOUNTER — RESULT REVIEW (OUTPATIENT)
Age: 75
End: 2020-11-11

## 2020-11-11 VITALS
DIASTOLIC BLOOD PRESSURE: 72 MMHG | TEMPERATURE: 97 F | HEART RATE: 53 BPM | RESPIRATION RATE: 16 BRPM | OXYGEN SATURATION: 100 % | SYSTOLIC BLOOD PRESSURE: 131 MMHG

## 2020-11-11 DIAGNOSIS — C43.70 MALIGNANT MELANOMA OF UNSPECIFIED LOWER LIMB, INCLUDING HIP: ICD-10-CM

## 2020-11-11 DIAGNOSIS — Z98.49 CATARACT EXTRACTION STATUS, UNSPECIFIED EYE: Chronic | ICD-10-CM

## 2020-11-11 DIAGNOSIS — C43.9 MALIGNANT MELANOMA OF SKIN, UNSPECIFIED: ICD-10-CM

## 2020-11-11 DIAGNOSIS — I10 ESSENTIAL (PRIMARY) HYPERTENSION: ICD-10-CM

## 2020-11-11 DIAGNOSIS — R01.1 CARDIAC MURMUR, UNSPECIFIED: ICD-10-CM

## 2020-11-11 DIAGNOSIS — F29 UNSPECIFIED PSYCHOSIS NOT DUE TO A SUBSTANCE OR KNOWN PHYSIOLOGICAL CONDITION: ICD-10-CM

## 2020-11-11 DIAGNOSIS — Z98.89 OTHER SPECIFIED POSTPROCEDURAL STATES: Chronic | ICD-10-CM

## 2020-11-11 PROCEDURE — 11423 EXC H-F-NK-SP B9+MARG 2.1-3: CPT

## 2020-11-11 PROCEDURE — 88342 IMHCHEM/IMCYTCHM 1ST ANTB: CPT

## 2020-11-11 PROCEDURE — 88341 IMHCHEM/IMCYTCHM EA ADD ANTB: CPT

## 2020-11-11 PROCEDURE — 14001 TIS TRNFR TRUNK 10.1-30SQCM: CPT | Mod: XS

## 2020-11-11 PROCEDURE — ZZZZZ: CPT

## 2020-11-11 PROCEDURE — 88341 IMHCHEM/IMCYTCHM EA ADD ANTB: CPT | Mod: 26

## 2020-11-11 PROCEDURE — 88305 TISSUE EXAM BY PATHOLOGIST: CPT

## 2020-11-11 PROCEDURE — 88342 IMHCHEM/IMCYTCHM 1ST ANTB: CPT | Mod: 26

## 2020-11-11 PROCEDURE — 88305 TISSUE EXAM BY PATHOLOGIST: CPT | Mod: 26

## 2020-11-11 PROCEDURE — 15240 FTH/GFT F/C/C/M/N/AX/G/H/F20: CPT

## 2020-11-11 RX ORDER — HEPARIN SODIUM 5000 [USP'U]/ML
5000 INJECTION INTRAVENOUS; SUBCUTANEOUS ONCE
Refills: 0 | Status: COMPLETED | OUTPATIENT
Start: 2020-11-11 | End: 2020-11-11

## 2020-11-11 RX ORDER — ACETAMINOPHEN 500 MG
1 TABLET ORAL
Qty: 0 | Refills: 0 | DISCHARGE

## 2020-11-11 RX ORDER — AMLODIPINE BESYLATE 2.5 MG/1
1 TABLET ORAL
Qty: 0 | Refills: 0 | DISCHARGE

## 2020-11-11 RX ORDER — SODIUM CHLORIDE 9 MG/ML
1000 INJECTION, SOLUTION INTRAVENOUS
Refills: 0 | Status: DISCONTINUED | OUTPATIENT
Start: 2020-11-11 | End: 2020-11-11

## 2020-11-11 RX ORDER — OXYCODONE HYDROCHLORIDE 5 MG/1
5 TABLET ORAL EVERY 6 HOURS
Refills: 0 | Status: DISCONTINUED | OUTPATIENT
Start: 2020-11-11 | End: 2020-11-11

## 2020-11-11 RX ORDER — ASCORBIC ACID 60 MG
1 TABLET,CHEWABLE ORAL
Qty: 0 | Refills: 0 | DISCHARGE

## 2020-11-11 RX ORDER — HYDROMORPHONE HYDROCHLORIDE 2 MG/ML
0.5 INJECTION INTRAMUSCULAR; INTRAVENOUS; SUBCUTANEOUS
Refills: 0 | Status: DISCONTINUED | OUTPATIENT
Start: 2020-11-11 | End: 2020-11-11

## 2020-11-11 RX ORDER — ACETAMINOPHEN 500 MG
650 TABLET ORAL EVERY 6 HOURS
Refills: 0 | Status: DISCONTINUED | OUTPATIENT
Start: 2020-11-11 | End: 2020-11-25

## 2020-11-11 RX ORDER — OXYCODONE HYDROCHLORIDE 5 MG/1
10 TABLET ORAL EVERY 8 HOURS
Refills: 0 | Status: DISCONTINUED | OUTPATIENT
Start: 2020-11-11 | End: 2020-11-11

## 2020-11-11 RX ADMIN — HEPARIN SODIUM 5000 UNIT(S): 5000 INJECTION INTRAVENOUS; SUBCUTANEOUS at 12:54

## 2020-11-11 RX ADMIN — SODIUM CHLORIDE 50 MILLILITER(S): 9 INJECTION, SOLUTION INTRAVENOUS at 11:31

## 2020-11-11 NOTE — ASU PATIENT PROFILE, ADULT - PSH
H/O exploratory laparotomy    S/P hysterectomy  1988 - Fibroid uterus  Status post cataract extraction  right eye done on 11/19/2016 and left eye  Status post small bowel resection  2010 exlap solomon sbr for closed loop obstruction  Thyroid nodule  Thyroid nodule removed - 1972

## 2020-11-11 NOTE — ASU PREOP CHECKLIST - BMI (KG/M2)
Body Location Override (Optional - Billing Will Still Be Based On Selected Body Map Location If Applicable): Right pretibial
Wound Evaluated By: Leslie
Add 50056 Cpt? (Important Note: In 2017 The Use Of 96462 Is Being Tracked By Cms To Determine Future Global Period Reimbursement For Global Periods): yes
Detail Level: Simple
23.1

## 2020-11-11 NOTE — BRIEF OPERATIVE NOTE - OPERATION/FINDINGS
Large diameter pigmented lesion on the plantar portion of the right hallux excised clinically negative margins, and reconstruction

## 2020-11-11 NOTE — ASU PATIENT PROFILE, ADULT - PMH
Bradycardia  h/o Bradycardia for many years - Asymptomatic  Diastolic murmur    HTN (hypertension)    Melanoma    Mood disorder  h/o agitation, anxiety 4 yrs ago  Osteopenia    SBO (small bowel obstruction)  2010 2012  Sebaceous cyst    White coat hypertension

## 2020-11-19 LAB — SURGICAL PATHOLOGY STUDY: SIGNIFICANT CHANGE UP

## 2020-11-27 NOTE — ASSESSMENT
[FreeTextEntry1] : We had a discussion regarding the new area of hyperpigmentation, on the bottom of her right great toe.\par Even though representative biopsies by dermatology has not been diagnostic of a neoplastic process, the concern regarding melanoma, either recurrent, or new/additional, was presented.\par \par Paperwork for surgical scheduling was submitted October 5, 2020.\par Surgery date still pending\par \par Reviewed in detail, all questions answered.\par \par Further management based on pathology.\par \par \par 11-27-20.\par Summary note.\par November 25, 2020, I spoken to her daughter.\par On November 11, 2020, the patient underwent wide excision of a large diameter pigmented lesion from the plantar right hallux, for a suspicious lesion with benign but discordant preoperative biopsies performed by dermatology.\par Pathology from surgery:\par Scar with overlying basilar hyperpigmentation, benign.\par My office will call to schedule a postoperative visit.\par Note dictated

## 2020-11-27 NOTE — HISTORY OF PRESENT ILLNESS
[de-identified] : 75 year-old lady who January 2020 had wide excision of a large diameter melanoma in situ from the outer RIGHT HALLUX, with negative margins, and reconstruction by Dr. Jabier ARMAS.\par There were additional, peripheral, pigmented lesions that were biopsied and were benign.\par The only proximate margin was the lateral nail bed.\par \par August 2020 she returned for a visit with me, with new pigmentation on the bottom of the same digit, separate from the site of excision and reconstruction.\par She first noticed the area ~May 2020.\par Asymptomatic.\par No interval growth, but persistence.\par I had referred her to our dermatology department (Dr Wilder AMBRIZ) for further evaluation.\par \par She had "scouting biopsies" was performed which showed atypical melanocytic hyperplasia, without atypia.\par \par \par No other specific or constitutional signs or symptoms.\par \par November 2019, she was referred by her podiatrist Dr. John RANDHAWA DPM with a melanoma in situ of the plantar/lateral aspect of the RIGHT HALLUX.\par \par This was an asymptomatic pigmented lesion of unclear duration noticed by her podiatrist when she went for foot care\par \par 2018 she was seen in our dermatology department for an epidermal inclusion cyst of the right posterior shoulder.\par She did not have a dermatologic survey at that time\par suggested followup, especially given the new pigmentation next to the resection site of her melanoma.\par \par \par PMD: Dr. Cody HAQUE\par The resident physician she sees is Dr. Precious Montejo\par \par No pacemaker or defibrillator.\par No anticoagulants\par \par PMH: \par Hypertension treated with amlodipine and chlorthalidone.\par Bradycardia.\par \par History of psychoses, treated with risperidone\par Follows up with psychiatry - Dr. Yanna SRINIVASAN\par \par She also takes vitamin D and calcium\par \par Previous thyroidectomy for benign disease, 1975\par \par She has a history of small bowel obstructions from adhesive bands from a previous hysterectomy.\par 2013 she had an exploratory laparotomy, with enterectomy.\par \par 2017 colonoscopy Dr. Natacha LANG, benign polypectomy\par \par She had cataract surgery in 2017.\par Eye examination in March 2020 was unremarkable -Dr. Marko Hull.\par \par She had a HUBER/BSO for benign disease in 1988.\par She has not seen a gynecologist since that time.\par \par Mammography in the fall of 2018 was unremarkable.\par She does not want to return for any follow-up studies.\par

## 2020-11-27 NOTE — REASON FOR VISIT
[Other: _____] : [unfilled] [FreeTextEntry2] : Preoperative visit for right excision of new area of pigmentation on plantar aspect of right hallux, history of melanoma in situ of the same digit.

## 2020-11-27 NOTE — PHYSICAL EXAM
[Normal] : supple, no neck mass and thyroid not enlarged [Normal Supraclavicular Lymph Nodes] : normal supraclavicular lymph nodes [Normal Neck Lymph Nodes] : normal neck lymph nodes  [Normal Groin Lymph Nodes] : normal groin lymph nodes [Normal Axillary Lymph Nodes] : normal axillary lymph nodes [Normal] : full range of motion and no deformities appreciated [de-identified] : Below

## 2020-11-27 NOTE — REVIEW OF SYSTEMS
[Negative] : Heme/Lymph [FreeTextEntry5] : Hypertension [de-identified] : Back pain [de-identified] : Melanoma [de-identified] : Psychosis

## 2021-01-31 LAB — SARS-COV-2 N GENE NPH QL NAA+PROBE: NOT DETECTED

## 2021-02-17 NOTE — ASU PATIENT PROFILE, ADULT - NS TRANSFER DENTURES
Patient is going to faxing a form for Dr Laguerre to fill out and sign.     She is faxing it to [phone].  And will make sure to put the fax back number on the form.       Advised I would let the nurses know about the incoming fax.       ok'd nfq  
Full

## 2021-03-04 ENCOUNTER — APPOINTMENT (OUTPATIENT)
Dept: SURGICAL ONCOLOGY | Facility: CLINIC | Age: 76
End: 2021-03-04
Payer: MEDICARE

## 2021-03-04 VITALS
HEIGHT: 64 IN | SYSTOLIC BLOOD PRESSURE: 157 MMHG | HEART RATE: 48 BPM | OXYGEN SATURATION: 99 % | DIASTOLIC BLOOD PRESSURE: 75 MMHG

## 2021-03-04 PROCEDURE — 99214 OFFICE O/P EST MOD 30 MIN: CPT

## 2021-03-04 NOTE — PHYSICAL EXAM
[Normal] : supple, no neck mass and thyroid not enlarged [Normal Neck Lymph Nodes] : normal neck lymph nodes  [Normal Supraclavicular Lymph Nodes] : normal supraclavicular lymph nodes [Normal Groin Lymph Nodes] : normal groin lymph nodes [Normal Axillary Lymph Nodes] : normal axillary lymph nodes [Normal] : full range of motion and no deformities appreciated [de-identified] : Below

## 2021-03-04 NOTE — ASSESSMENT
[FreeTextEntry1] : Clinically doing well.\par \par Presently, no imaging studies are warranted\par \par I have asked to see her in another next months, sooner if needed

## 2021-03-04 NOTE — HISTORY OF PRESENT ILLNESS
[de-identified] : \par 75-year-old lady with a history of a previous large diameter melanoma in situ involving the plantar aspect of her right hallux in January 2020.\par November 2020:\par Excision of a new, diameter entheses 2 x 1 cm entheses pigmented lesion adjacent to the skin graft from the prior reconstruction.\par \par Prior to the recent operation, she was seen by dermatology.\par Their biopsies were benign, BUT this was considered discordant due to potential under sampling.\par \par Pathology from excision of the new area of pigmentation:\par Scar with overlying basilar hyperpigmentation.\par All findings benign.\par \par She presents to resume follow-up for her melanoma.\par \par \par January 2020: wide excision of a large diameter melanoma in situ from the outer RIGHT HALLUX, with negative margins.\par Reconstruction by Dr. Jabier ARMAS.\par There were additional, peripheral, pigmented lesions that were biopsied and were benign.\par The only proximate margin was the lateral nail bed.\par \par \par August 2020 at a scheduled visit with me, she had a new area of pigmentation on the bottom of the same digit.\par This was separate from the site of excision and reconstruction.\par She first noticed the area ~May 2020.\par Asymptomatic.\par No interval growth, but persistence.\par I had referred her to our dermatology department (Dr Wilder AMBRIZ) for further evaluation.\par \par She had "scouting biopsies" was performed which showed atypical melanocytic hyperplasia, without atypia.\par \par \par No other specific or constitutional signs or symptoms.\par \par November 2019, she was referred by her podiatrist Dr. John RANDHAWA DPM with a melanoma in situ of the plantar/lateral aspect of the RIGHT HALLUX.\par \par This was an asymptomatic pigmented lesion of unclear duration noticed by her podiatrist when she went for foot care\par \par 2018 she was seen in our dermatology department for an epidermal inclusion cyst of the right posterior shoulder.\par She did not have a dermatologic survey at that time\par suggested followup, especially given the new pigmentation next to the resection site of her melanoma.\par \par \par Derm:\par Our dermatology department.\par Follow-up suggested.\par \par \par PMD: Dr. Cody HAQUE, in our medical clinic\par Typically sees one of the residents.\par \par No pacemaker or defibrillator.\par No anticoagulants\par \par PMH: \par Hypertension treated with amlodipine and chlorthalidone.\par Bradycardia.\par \par History of psychoses, treated with risperidone\par Follows up with psychiatry - Dr. Yanna SRINIVASAN\par \par She also takes vitamin D and calcium\par \par Previous thyroidectomy for benign disease, 1975\par \par She has a history of small bowel obstructions from adhesive bands from a previous hysterectomy.\par 2013 she had an exploratory laparotomy, with enterectomy.\par \par \par She had cataract surgery in 2017.\par Eye examination in March 2020 was unremarkable -Dr. Marko Hull.\par \par \par She had a HUBER/BSO for benign disease in 1988.\par She has not seen a gynecologist since that time.\par \par \par Mammography in the fall of 2018 was unremarkable.\par She does NOT want to return for any follow-up studies.\par \par \par 2017 colonoscopy Dr. Natacha LANG, benign polypectomy\par

## 2021-03-04 NOTE — REVIEW OF SYSTEMS
[Negative] : Heme/Lymph [FreeTextEntry3] : Prior cataract surgery [FreeTextEntry5] : Hypertension [FreeTextEntry8] : History of surgery for SBO [de-identified] : Melanoma [de-identified] : Under the care of psychiatry

## 2021-03-04 NOTE — REASON FOR VISIT
[Follow-Up Visit] : a follow-up visit for [Other: _____] : [unfilled] [FreeTextEntry2] : Right plantar large diameter melanoma in situ

## 2021-03-15 ENCOUNTER — OUTPATIENT (OUTPATIENT)
Dept: OUTPATIENT SERVICES | Facility: HOSPITAL | Age: 76
LOS: 1 days | End: 2021-03-15

## 2021-03-15 ENCOUNTER — APPOINTMENT (OUTPATIENT)
Dept: INTERNAL MEDICINE | Facility: CLINIC | Age: 76
End: 2021-03-15
Payer: MEDICARE

## 2021-03-15 ENCOUNTER — RESULT REVIEW (OUTPATIENT)
Age: 76
End: 2021-03-15

## 2021-03-15 VITALS
HEIGHT: 64 IN | BODY MASS INDEX: 22.88 KG/M2 | SYSTOLIC BLOOD PRESSURE: 160 MMHG | OXYGEN SATURATION: 98 % | DIASTOLIC BLOOD PRESSURE: 84 MMHG | HEART RATE: 58 BPM | WEIGHT: 134 LBS

## 2021-03-15 VITALS — DIASTOLIC BLOOD PRESSURE: 68 MMHG | SYSTOLIC BLOOD PRESSURE: 150 MMHG

## 2021-03-15 VITALS — TEMPERATURE: 97.4 F

## 2021-03-15 DIAGNOSIS — Z98.49 CATARACT EXTRACTION STATUS, UNSPECIFIED EYE: Chronic | ICD-10-CM

## 2021-03-15 DIAGNOSIS — Z98.89 OTHER SPECIFIED POSTPROCEDURAL STATES: Chronic | ICD-10-CM

## 2021-03-15 LAB
ALBUMIN SERPL ELPH-MCNC: 4.9 G/DL — SIGNIFICANT CHANGE UP (ref 3.3–5)
ALP SERPL-CCNC: 84 U/L — SIGNIFICANT CHANGE UP (ref 40–120)
ALT FLD-CCNC: 12 U/L — SIGNIFICANT CHANGE UP (ref 4–33)
ANION GAP SERPL CALC-SCNC: 11 MMOL/L — SIGNIFICANT CHANGE UP (ref 7–14)
AST SERPL-CCNC: 23 U/L — SIGNIFICANT CHANGE UP (ref 4–32)
BASOPHILS # BLD AUTO: 0.02 K/UL — SIGNIFICANT CHANGE UP (ref 0–0.2)
BASOPHILS NFR BLD AUTO: 0.4 % — SIGNIFICANT CHANGE UP (ref 0–2)
BILIRUB SERPL-MCNC: 0.2 MG/DL — SIGNIFICANT CHANGE UP (ref 0.2–1.2)
BUN SERPL-MCNC: 16 MG/DL — SIGNIFICANT CHANGE UP (ref 7–23)
CALCIUM SERPL-MCNC: 10.4 MG/DL — SIGNIFICANT CHANGE UP (ref 8.4–10.5)
CHLORIDE SERPL-SCNC: 100 MMOL/L — SIGNIFICANT CHANGE UP (ref 98–107)
CO2 SERPL-SCNC: 28 MMOL/L — SIGNIFICANT CHANGE UP (ref 22–31)
CREAT SERPL-MCNC: 0.82 MG/DL — SIGNIFICANT CHANGE UP (ref 0.5–1.3)
EOSINOPHIL # BLD AUTO: 0 K/UL — SIGNIFICANT CHANGE UP (ref 0–0.5)
EOSINOPHIL NFR BLD AUTO: 0 % — SIGNIFICANT CHANGE UP (ref 0–6)
GLUCOSE SERPL-MCNC: 109 MG/DL — HIGH (ref 70–99)
HCT VFR BLD CALC: 39.3 % — SIGNIFICANT CHANGE UP (ref 34.5–45)
HGB BLD-MCNC: 12.3 G/DL — SIGNIFICANT CHANGE UP (ref 11.5–15.5)
IANC: 4.12 K/UL — SIGNIFICANT CHANGE UP (ref 1.5–8.5)
IMM GRANULOCYTES NFR BLD AUTO: 0.4 % — SIGNIFICANT CHANGE UP (ref 0–1.5)
LYMPHOCYTES # BLD AUTO: 0.83 K/UL — LOW (ref 1–3.3)
LYMPHOCYTES # BLD AUTO: 15.5 % — SIGNIFICANT CHANGE UP (ref 13–44)
MCHC RBC-ENTMCNC: 28.5 PG — SIGNIFICANT CHANGE UP (ref 27–34)
MCHC RBC-ENTMCNC: 31.3 GM/DL — LOW (ref 32–36)
MCV RBC AUTO: 91.2 FL — SIGNIFICANT CHANGE UP (ref 80–100)
MONOCYTES # BLD AUTO: 0.37 K/UL — SIGNIFICANT CHANGE UP (ref 0–0.9)
MONOCYTES NFR BLD AUTO: 6.9 % — SIGNIFICANT CHANGE UP (ref 2–14)
NEUTROPHILS # BLD AUTO: 4.12 K/UL — SIGNIFICANT CHANGE UP (ref 1.8–7.4)
NEUTROPHILS NFR BLD AUTO: 76.8 % — SIGNIFICANT CHANGE UP (ref 43–77)
NRBC # BLD: 0 /100 WBCS — SIGNIFICANT CHANGE UP
NRBC # FLD: 0 K/UL — SIGNIFICANT CHANGE UP
PLATELET # BLD AUTO: 217 K/UL — SIGNIFICANT CHANGE UP (ref 150–400)
POTASSIUM SERPL-MCNC: 4 MMOL/L — SIGNIFICANT CHANGE UP (ref 3.5–5.3)
POTASSIUM SERPL-SCNC: 4 MMOL/L — SIGNIFICANT CHANGE UP (ref 3.5–5.3)
PROT SERPL-MCNC: 7.8 G/DL — SIGNIFICANT CHANGE UP (ref 6–8.3)
RBC # BLD: 4.31 M/UL — SIGNIFICANT CHANGE UP (ref 3.8–5.2)
RBC # FLD: 13.5 % — SIGNIFICANT CHANGE UP (ref 10.3–14.5)
SODIUM SERPL-SCNC: 139 MMOL/L — SIGNIFICANT CHANGE UP (ref 135–145)
WBC # BLD: 5.36 K/UL — SIGNIFICANT CHANGE UP (ref 3.8–10.5)
WBC # FLD AUTO: 5.36 K/UL — SIGNIFICANT CHANGE UP (ref 3.8–10.5)

## 2021-03-15 PROCEDURE — 99213 OFFICE O/P EST LOW 20 MIN: CPT | Mod: GE

## 2021-03-19 DIAGNOSIS — F29 UNSPECIFIED PSYCHOSIS NOT DUE TO A SUBSTANCE OR KNOWN PHYSIOLOGICAL CONDITION: ICD-10-CM

## 2021-03-19 DIAGNOSIS — R25.1 TREMOR, UNSPECIFIED: ICD-10-CM

## 2021-03-19 DIAGNOSIS — I10 ESSENTIAL (PRIMARY) HYPERTENSION: ICD-10-CM

## 2021-03-19 NOTE — PHYSICAL EXAM
[Normal] : no rash [No Focal Deficits] : no focal deficits [Normal Gait] : normal gait [Normal Affect] : the affect was normal [Normal Insight/Judgement] : insight and judgment were intact [de-identified] : MARITZA [de-identified] : Tremors of both hands

## 2021-03-19 NOTE — PLAN
[FreeTextEntry1] : 75F with HTN, psychosis, bradycardia, and osteopenia who is here for a follow up visit.\par \par 1) Tremors - likely benign essential tremors. Patient will meet with Neurology next month.\par Continue to monitor.\par \par 2) HTN - continue with amlodipine 10mg daily. \par \par 3) Psychosis - follows with psychiatry regularly. Continue with risperidone 0.5 mg qhs.\par \par 4) Osteopenia - patient takes calcium and vitamin D daily OTC.\par \par 5) HCM - Up-to-date on screenings and vaccinations. Will discuss DEXA scan at next visit.\par \par Patient discussed with Dr. Shepherd.\par All risks and benefits were discussed with patient. \par Patient and attending agreed with the above assessment and plan.\par \par Precious Montejo M.D. PGY-3\par Internal Medicine\par [facility] [place]\par [phone]

## 2021-03-19 NOTE — HISTORY OF PRESENT ILLNESS
[FreeTextEntry1] : The patient is here for follow up.  [de-identified] : 75F with HTN, psychosis, bradycardia, and osteopenia who presents for a follow up visit.\par \par 1) Tremors - patient has had tremors since she was a young child, but has noticed for the past year her hand tremors have worsened. They have worsened to the point where it is difficult to write, or use a knife and fork to eat. She does not have tremors at rest. Her parents had the same issue when they got older. She has an appointment with Neurology next month.\par \par 2) HTN - the patient takes amlodipine regularly. She continues to eat a low-salt diet and walks daily.\par \par 3) Psychosis - denies auditory or visual hallucinations, suicidal or homicidal ideation, or depressed mood. She follows up with Psychiatry every three months. \par \par HCM - The patient received her flu shot this year at the local pharmacy. She was fully vaccinated for COVID-19 last month in February 2021. Her next colonoscopy is due in 2022.

## 2021-04-29 ENCOUNTER — APPOINTMENT (OUTPATIENT)
Dept: NEUROLOGY | Facility: CLINIC | Age: 76
End: 2021-04-29
Payer: MEDICARE

## 2021-04-29 ENCOUNTER — RESULT REVIEW (OUTPATIENT)
Age: 76
End: 2021-04-29

## 2021-04-29 VITALS
DIASTOLIC BLOOD PRESSURE: 88 MMHG | HEIGHT: 64 IN | SYSTOLIC BLOOD PRESSURE: 170 MMHG | HEART RATE: 90 BPM | BODY MASS INDEX: 22.88 KG/M2 | WEIGHT: 134 LBS

## 2021-04-29 VITALS — TEMPERATURE: 97.2 F

## 2021-04-29 PROCEDURE — 99204 OFFICE O/P NEW MOD 45 MIN: CPT

## 2021-04-29 RX ORDER — IBUPROFEN 200 MG
600 CAPSULE ORAL DAILY
Qty: 90 | Refills: 3 | Status: DISCONTINUED | COMMUNITY
Start: 2019-02-06 | End: 2021-04-29

## 2021-04-29 NOTE — HISTORY OF PRESENT ILLNESS
[FreeTextEntry1] : I had the pleasure of seeing Ms. Rebecca LECHUGA in the office today.  She is a 76 year right-handed patient who was referred for neurologic evaluation at your kind suggestion.  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  Several years ago, she experienced hyperreligiosity.  She was diagnosed with bipolar disorder.  She has been treated with risperidone for several years.  When she discontinued that medication, her symptoms recurred.  He is under the care of a psychiatrist Dr. Yanna Garcia ([phone]).\par \par She reports a long history of tremors in her hands.  She recalls that both her parents suffer from tremors later in life.  Her mother developed Alzheimer's disease in her 70s.\par \par Ms. Johnson notes that she walks with a stooped posture and shuffling gait.  Her handwriting is sloppy.  Her mind however remains clear.  She is able to dress, bathe, toilet herself and drive her car.  She lives alone.

## 2021-04-29 NOTE — CONSULT LETTER
[Dear  ___] : Dear  [unfilled], [Consult Letter:] : I had the pleasure of evaluating your patient, [unfilled]. [Please see my note below.] : Please see my note below. [Consult Closing:] : Thank you very much for allowing me to participate in the care of this patient.  If you have any questions, please do not hesitate to contact me. [Sincerely,] : Sincerely, [FreeTextEntry3] : .Signature [DrKm  ___] : Dr. FATIMA

## 2021-04-29 NOTE — PHYSICAL EXAM
[FreeTextEntry1] : Constitutional:  Patient was elderly and mildly frail but in no acute distress. \par \par Head:  Normocephalic, atraumatic. Tympanic membranes were obscured by cerumen. \par \par Neck:  Supple with full range of motion.  She was kyphotic.\par \par Cardiovascular:  Cardiac rhythm was regular without murmur. There were no carotid bruits. Peripheral pulses were full and symmetric. \par \par Respiratory:  Lungs were clear. \par \par Abdomen:  Soft and nontender. \par \par Spine:  Nontender. \par \par Skin:  There were no rashes. \par \par NEUROLOGICAL EXAMINATION:\par \par Mental Status: Patient was alert and oriented. Speech was fluent. There was no dysarthria but she was hypophonic.  She recalled 1 of 3 words after several minutes. \par \par Cranial Nerves: \par \par II: She could finger count bilaterally.  Pupils were surgical but reactive. Visual fields were full. Funduscopic examination was normal. \par \par III, IV, VI:  Eye movements were full without nystagmus. \par \par V: Facial sensation was intact. \par \par VII: There was moderate hypomimia. \par \par VIII: Hearing was equal. \par \par IX, X: Palatal movement was normal. Phonation was normal. \par \par XI: Sternocleidomastoids and trapezii were normal. \par \par XII: Tongue was midline and movements slow. There was no lingual atrophy or fasciculations. \par \par Motor Examination: There was cogwheel rigidity at the elbows.  There were right greater than left postural and endpoint tremors.  There was no significant rest tremor.  Fine finger and rapid alternating movements were slow.\par \par Sensory Examination: Pinprick, vibration and joint position sense were intact. \par \par Reflexes: DTRs were 2+ throughout. \par \par Plantar Responses: Plantar responses were flexor. \par \par Coordination/Cerebellar Function: There was no dysmetria on finger to nose or heel to shin testing. \par \par Gait/Stance: Posture was stooped.  Strides were shuffling and short.  Turns were decomposed.  Romberg was negative.  Tandem was poor.\par

## 2021-04-29 NOTE — ASSESSMENT
[FreeTextEntry1] : Ms. Johnson is a 76-year-old with history of psychosis maintained on chronic neuroleptics.  She has a long history of arm tremors and a family history of same.  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  In addition; however, she appears very parkinsonian.  This may very well be neuroleptic induced however I cannot exclude superimposed idiopathic Parkinson's disease.\par \par I suggested that she undergo an MRI of the brain and a serologic evaluation.  This will be followed by a dopamine uptake scan.  Further management will depend upon these results and her clinical course.  I will keep you informed of her status.

## 2021-05-13 ENCOUNTER — NON-APPOINTMENT (OUTPATIENT)
Age: 76
End: 2021-05-13

## 2021-05-13 LAB
CERULOPLASMIN SERPL-MCNC: 28 MG/DL
CRP SERPL-MCNC: 4 MG/L
ERYTHROCYTE [SEDIMENTATION RATE] IN BLOOD BY WESTERGREN METHOD: 30 MM/HR
FOLATE SERPL-MCNC: 14.6 NG/ML
HCYS SERPL-MCNC: 17.6 UMOL/L
T3FREE SERPL-MCNC: 3.35 PG/ML
T4 FREE SERPL-MCNC: 1.1 NG/DL
TSH SERPL-ACNC: 2.85 UIU/ML
VIT B12 SERPL-MCNC: 252 PG/ML

## 2021-05-14 LAB — T PALLIDUM AB SER QL IA: NEGATIVE

## 2021-05-15 LAB
ANACR T: NEGATIVE
COPPER SERPL-MCNC: 113 UG/DL

## 2021-05-16 ENCOUNTER — OUTPATIENT (OUTPATIENT)
Dept: OUTPATIENT SERVICES | Facility: HOSPITAL | Age: 76
LOS: 1 days | End: 2021-05-16
Payer: MEDICARE

## 2021-05-16 ENCOUNTER — APPOINTMENT (OUTPATIENT)
Dept: MRI IMAGING | Facility: IMAGING CENTER | Age: 76
End: 2021-05-16
Payer: MEDICARE

## 2021-05-16 DIAGNOSIS — Z98.49 CATARACT EXTRACTION STATUS, UNSPECIFIED EYE: Chronic | ICD-10-CM

## 2021-05-16 DIAGNOSIS — G20 PARKINSON'S DISEASE: ICD-10-CM

## 2021-05-16 DIAGNOSIS — Z98.89 OTHER SPECIFIED POSTPROCEDURAL STATES: Chronic | ICD-10-CM

## 2021-05-16 PROCEDURE — 70551 MRI BRAIN STEM W/O DYE: CPT | Mod: 26,MG

## 2021-05-16 PROCEDURE — G1004: CPT

## 2021-05-16 PROCEDURE — 70551 MRI BRAIN STEM W/O DYE: CPT

## 2021-05-17 ENCOUNTER — NON-APPOINTMENT (OUTPATIENT)
Age: 76
End: 2021-05-17

## 2021-05-17 LAB

## 2021-05-19 LAB — METHYLMALONATE SERPL-SCNC: 232 NMOL/L

## 2021-05-21 LAB
AMPA-R ABCBA: NEGATIVE
AMPHIPHYSIN IGG TITR SER IF: NEGATIVE TITER
CASPR2-IGG CBA, S: NEGATIVE
CV2 IGG TITR SER: NEGATIVE TITER
GABA-B ABCBA: NEGATIVE
GAD65 AB SER-MCNC: 0 NMOL/L
GLIAL NUC TYPE 1 AB TITR SER: NEGATIVE TITER
HU1 AB TITR SER: NEGATIVE TITER
HU2 AB TITR SER IF: NEGATIVE TITER
HU3 AB TITR SER: NEGATIVE TITER
IGLON5 IFA, S: NEGATIVE
IMMUNOLOGIST REVIEW: NORMAL
LGI1-IGG CBA, S: NEGATIVE
NIF IFA, S: NEGATIVE
NMDA-R ABCBA: NEGATIVE
PCA-1 AB TITR SER: NEGATIVE TITER
PCA-2 AB TITR SER: NEGATIVE TITER
PCA-TR AB TITR SER: NEGATIVE TITER
REFLEX ADDED: NORMAL
VIT B1 SERPL-MCNC: 96.2 NMOL/L

## 2021-05-21 PROCEDURE — 99443: CPT | Mod: 95

## 2021-05-28 ENCOUNTER — APPOINTMENT (OUTPATIENT)
Dept: NUCLEAR MEDICINE | Facility: IMAGING CENTER | Age: 76
End: 2021-05-28
Payer: MEDICARE

## 2021-05-28 ENCOUNTER — OUTPATIENT (OUTPATIENT)
Dept: OUTPATIENT SERVICES | Facility: HOSPITAL | Age: 76
LOS: 1 days | End: 2021-05-28
Payer: MEDICARE

## 2021-05-28 DIAGNOSIS — G20 PARKINSON'S DISEASE: ICD-10-CM

## 2021-05-28 DIAGNOSIS — Z98.89 OTHER SPECIFIED POSTPROCEDURAL STATES: Chronic | ICD-10-CM

## 2021-05-28 DIAGNOSIS — Z98.49 CATARACT EXTRACTION STATUS, UNSPECIFIED EYE: Chronic | ICD-10-CM

## 2021-05-28 PROCEDURE — 78803 RP LOCLZJ TUM SPECT 1 AREA: CPT | Mod: 26,MG

## 2021-05-28 PROCEDURE — 78803 RP LOCLZJ TUM SPECT 1 AREA: CPT

## 2021-05-28 PROCEDURE — G1004: CPT

## 2021-05-28 PROCEDURE — A9584: CPT

## 2021-06-01 ENCOUNTER — NON-APPOINTMENT (OUTPATIENT)
Age: 76
End: 2021-06-01

## 2021-06-08 ENCOUNTER — NON-APPOINTMENT (OUTPATIENT)
Age: 76
End: 2021-06-08

## 2021-07-08 ENCOUNTER — APPOINTMENT (OUTPATIENT)
Dept: OPHTHALMOLOGY | Facility: CLINIC | Age: 76
End: 2021-07-08
Payer: MEDICARE

## 2021-07-08 ENCOUNTER — NON-APPOINTMENT (OUTPATIENT)
Age: 76
End: 2021-07-08

## 2021-07-08 PROCEDURE — 92014 COMPRE OPH EXAM EST PT 1/>: CPT

## 2021-07-08 PROCEDURE — 92250 FUNDUS PHOTOGRAPHY W/I&R: CPT

## 2021-07-29 ENCOUNTER — APPOINTMENT (OUTPATIENT)
Dept: NEUROLOGY | Facility: CLINIC | Age: 76
End: 2021-07-29
Payer: MEDICARE

## 2021-07-29 PROCEDURE — 99448 NTRPROF PH1/NTRNET/EHR 21-30: CPT

## 2021-07-29 NOTE — ASSESSMENT
[FreeTextEntry1] : Ms. Johnson is a 76-year-old with history of psychosis maintained on risperidone.  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  She is tolerating Sinemet 25/100 1-1/2 tablets 3 times a day.  She will gradually increase her dose to 2 tablets 3 times a day.  She does not feel that Sinemet has provided her with any benefit to date.  I suggested an in office visit to assess her examination.  Further management will depend upon her clinical course.

## 2021-07-29 NOTE — HISTORY OF PRESENT ILLNESS
[FreeTextEntry1] : Ms. Rebecca Johnson was initially evaluated on April 29, 2021.  She is a 76 year right-handed patient with history of psychosis maintained on neuroleptics.  She had a long history of arm tremors and family history of a similar disorder.  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  She appeared extremely parkinsonian.  I was uncertain whether this was entirely neuroleptic induced or whether she had superimposed Parkinson's disease.  She appeared cognitively well. \par \par A comprehensive serologic evaluation was unremarkable.  MRI of the brain revealed chronic microvascular changes and an old left basal ganglia lacunar infarction.  A dopamine uptake scan was consistent with Parkinson's disease or a parkinsonian syndrome.\par \par In response, she was begun on Sinemet.  She is currently on 1-1/2 tablets 3 times a day.  There has been no significant change in her tremor or gait.  She remains on risperidone 0.5 mg at bedtime.

## 2021-07-29 NOTE — REASON FOR VISIT
[Home] : at home, [unfilled] , at the time of the visit. [Medical Office: ([facility])___] : at the medical office located in

## 2021-08-04 ENCOUNTER — APPOINTMENT (OUTPATIENT)
Dept: INTERNAL MEDICINE | Facility: CLINIC | Age: 76
End: 2021-08-04

## 2021-08-04 ENCOUNTER — OUTPATIENT (OUTPATIENT)
Dept: OUTPATIENT SERVICES | Facility: HOSPITAL | Age: 76
LOS: 1 days | End: 2021-08-04

## 2021-08-04 ENCOUNTER — APPOINTMENT (OUTPATIENT)
Dept: INTERNAL MEDICINE | Facility: CLINIC | Age: 76
End: 2021-08-04
Payer: MEDICARE

## 2021-08-04 VITALS
HEART RATE: 55 BPM | HEIGHT: 64 IN | BODY MASS INDEX: 22.2 KG/M2 | SYSTOLIC BLOOD PRESSURE: 136 MMHG | DIASTOLIC BLOOD PRESSURE: 74 MMHG | WEIGHT: 130 LBS

## 2021-08-04 DIAGNOSIS — Z12.11 ENCOUNTER FOR SCREENING FOR MALIGNANT NEOPLASM OF COLON: ICD-10-CM

## 2021-08-04 DIAGNOSIS — Z98.49 CATARACT EXTRACTION STATUS, UNSPECIFIED EYE: Chronic | ICD-10-CM

## 2021-08-04 DIAGNOSIS — D03.9 MELANOMA IN SITU, UNSPECIFIED: ICD-10-CM

## 2021-08-04 DIAGNOSIS — Z48.02 ENCOUNTER FOR REMOVAL OF SUTURES: ICD-10-CM

## 2021-08-04 DIAGNOSIS — R25.1 TREMOR, UNSPECIFIED: ICD-10-CM

## 2021-08-04 DIAGNOSIS — Z01.818 ENCOUNTER FOR OTHER PREPROCEDURAL EXAMINATION: ICD-10-CM

## 2021-08-04 DIAGNOSIS — Z98.89 OTHER SPECIFIED POSTPROCEDURAL STATES: Chronic | ICD-10-CM

## 2021-08-04 DIAGNOSIS — Z09 ENCOUNTER FOR FOLLOW-UP EXAMINATION AFTER COMPLETED TREATMENT FOR CONDITIONS OTHER THAN MALIGNANT NEOPLASM: ICD-10-CM

## 2021-08-04 DIAGNOSIS — Z12.83 ENCOUNTER FOR SCREENING FOR MALIGNANT NEOPLASM OF SKIN: ICD-10-CM

## 2021-08-04 PROCEDURE — 99442: CPT | Mod: 95

## 2021-08-04 RX ORDER — MULTIVIT-MIN/FOLIC/VIT K/LYCOP 400-300MCG
25 MCG TABLET ORAL DAILY
Refills: 0 | Status: ACTIVE | COMMUNITY
Start: 2019-02-06

## 2021-08-11 DIAGNOSIS — I10 ESSENTIAL (PRIMARY) HYPERTENSION: ICD-10-CM

## 2021-08-11 PROBLEM — Z48.02 VISIT FOR SUTURE REMOVAL: Status: RESOLVED | Noted: 2018-08-03 | Resolved: 2021-08-11

## 2021-08-11 PROBLEM — Z01.818 PREOP TESTING: Status: RESOLVED | Noted: 2020-11-08 | Resolved: 2021-08-11

## 2021-08-11 PROBLEM — Z12.83 SKIN CANCER SCREENING: Status: RESOLVED | Noted: 2020-08-21 | Resolved: 2021-08-11

## 2021-08-11 PROBLEM — D03.9 MELANOMA IN SITU: Noted: 2020-08-21

## 2021-08-11 PROBLEM — R25.1 TREMOR OF BOTH HANDS: Noted: 2021-03-15

## 2021-08-11 NOTE — REVIEW OF SYSTEMS
[Negative] : Constitutional [Chest Pain] : no chest pain [Palpitations] : no palpitations [Shortness Of Breath] : no shortness of breath [Dyspnea on Exertion] : no dyspnea on exertion

## 2021-08-11 NOTE — HISTORY OF PRESENT ILLNESS
[Verbal consent obtained from patient] : the patient, [unfilled] [Home] : at home, [unfilled] , at the time of the visit. [Medical Office: ([facility])___] : at the medical office located in  [FreeTextEntry1] : follow up hypertension [de-identified] : Patient presents via telephone for follow up visit without any concerns.  Patient is worried about being exposed to Covid 19 and hence preferred to have a telephonic visit.  Her blood pressure readings at home are doing well.\par \par Patient reports seeing Neurology recently and having her medication regimen adjusted, she is doing well on the new regimen.

## 2021-08-11 NOTE — PHYSICAL EXAM
[No Acute Distress] : no acute distress [Normal Voice/Communication] : normal voice/communication [No Respiratory Distress] : no respiratory distress  [de-identified] : Visit performed telephonically hence exam is limited and based on auditory cues.

## 2021-09-09 ENCOUNTER — APPOINTMENT (OUTPATIENT)
Dept: SURGICAL ONCOLOGY | Facility: CLINIC | Age: 76
End: 2021-09-09
Payer: MEDICARE

## 2021-09-09 VITALS
HEART RATE: 80 BPM | BODY MASS INDEX: 23.21 KG/M2 | RESPIRATION RATE: 16 BRPM | SYSTOLIC BLOOD PRESSURE: 140 MMHG | OXYGEN SATURATION: 94 % | WEIGHT: 131 LBS | HEIGHT: 63 IN | DIASTOLIC BLOOD PRESSURE: 76 MMHG | TEMPERATURE: 98.1 F

## 2021-09-09 PROCEDURE — 99213 OFFICE O/P EST LOW 20 MIN: CPT

## 2021-09-09 NOTE — REVIEW OF SYSTEMS
[Negative] : Heme/Lymph [FreeTextEntry5] : Hypertension [FreeTextEntry8] : Benign colon polyps [de-identified] : Melanoma [de-identified] : Tremors [de-identified] : Followed by psychiatry

## 2021-09-09 NOTE — HISTORY OF PRESENT ILLNESS
[de-identified] : \par 76 year-old lady with a history of a previous large diameter melanoma in situ involving the plantar aspect of her RIGHT HALLUX in January 2020.\par November 2020:\par Excision of a new, diameter (2 x 1 cm) pigmented lesion adjacent to the skin graft from the prior reconstruction.\par \par Prior to the recent operation, she was seen by dermatology.\par Their biopsies were benign, BUT this was considered discordant due to potential under sampling.\par \par Pathology from excision of the new area of pigmentation:\par Scar with overlying basilar hyperpigmentation.\par All findings benign.\par \par \par She presents to resume follow-up for her melanoma.\par \par \par January 2020: wide excision of a large diameter melanoma in situ from the outer RIGHT HALLUX, with negative margins.\par Reconstruction by Dr. Jabier ARMAS.\par There were additional, peripheral, pigmented lesions that were biopsied and were benign.\par The only proximate margin was the lateral nail bed.\par \par \par August 2020 at a scheduled visit with me, she had a new area of pigmentation on the bottom of the same digit.\par This was separate from the site of excision and reconstruction.\par She first noticed the area ~May 2020.\par Asymptomatic.\par No interval growth, but persistence.\par I had referred her to our dermatology department (Dr Wilder AMBRIZ) for further evaluation.\par \par She had "scouting biopsies" was performed which showed atypical melanocytic hyperplasia, without atypia.\par \par \par No other specific or constitutional signs or symptoms.\par \par November 2019, she was referred by her podiatrist Dr. John RANDHAWA DPM with a melanoma in situ of the plantar/lateral aspect of the RIGHT HALLUX.\par \par This was an asymptomatic pigmented lesion of unclear duration noticed by her podiatrist when she went for foot care\par \par 2018 she was seen in our dermatology department for an epidermal inclusion cyst of the right posterior shoulder.\par She did not have a dermatologic survey at that time\par suggested followup, especially given the new pigmentation next to the resection site of her melanoma.\par \par \par Derm:\par Our dermatology department.\par DECLINES F/U\par \par \par PMD: Dr. Cody HAQUE, in our medical clinic\par Typically sees one of the residents.\par \par No pacemaker or defibrillator.\par No anticoagulants\par \par PMH: \par Hypertension treated with amlodipine and chlorthalidone.\par Bradycardia.\par \par History of psychoses, treated with risperidone\par Follows up with psychiatry - Dr. Yanna SRINIVASAN.\par \par + Tremors.\par Neurology: Dr. Suhail BARNES\par \par She also takes vitamin D and calcium\par \par Previous thyroidectomy for benign disease, 1975\par \par She has a history of small bowel obstructions from adhesive bands from a previous hysterectomy.\par 2013 she had an exploratory laparotomy, with enterectomy.\par \par \par She had cataract surgery in 2017.\par Eye examination in March 2020 was unremarkable -Dr. Marko Hull.\par \par \par She had a HUBER/BSO for benign disease in 1988.\par She has not seen a gynecologist since that time.\par \par \par Mammography in the fall of 2018 was unremarkable.\par She does NOT want to return for any follow-up studies.\par \par \par 2017 colonoscopy Dr. Natacha LANG, benign polypectomy\par

## 2021-09-09 NOTE — PHYSICAL EXAM
[Normal] : supple, no neck mass and thyroid not enlarged [Normal Neck Lymph Nodes] : normal neck lymph nodes  [Normal Supraclavicular Lymph Nodes] : normal supraclavicular lymph nodes [Normal Groin Lymph Nodes] : normal groin lymph nodes [Normal Axillary Lymph Nodes] : normal axillary lymph nodes [Normal] : full range of motion and no deformities appreciated [de-identified] : Below

## 2021-09-09 NOTE — REASON FOR VISIT
[Follow-Up Visit] : a follow-up visit for [Other: _____] : [unfilled] [FreeTextEntry2] : Large diameter right plantar melanoma in situ

## 2021-10-11 ENCOUNTER — APPOINTMENT (OUTPATIENT)
Dept: NEUROLOGY | Facility: CLINIC | Age: 76
End: 2021-10-11
Payer: MEDICARE

## 2021-10-11 VITALS
HEIGHT: 64 IN | DIASTOLIC BLOOD PRESSURE: 72 MMHG | WEIGHT: 129 LBS | SYSTOLIC BLOOD PRESSURE: 129 MMHG | HEART RATE: 46 BPM | BODY MASS INDEX: 22.02 KG/M2

## 2021-10-11 PROCEDURE — 99213 OFFICE O/P EST LOW 20 MIN: CPT

## 2021-10-11 NOTE — HISTORY OF PRESENT ILLNESS
[FreeTextEntry1] : Ms. Rebecca Johnson returned to the office having been last seen on July 29, 2021 via telemedicine.  She is a 76 year right-handed patient with history of psychosis maintained on neuroleptics.  She had a long history of arm tremors and family history of a similar disorder.  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  She appeared extremely parkinsonian.  I was uncertain whether this was entirely neuroleptic induced or whether she had superimposed Parkinson's disease.  She appeared cognitively well. \par \par A comprehensive serologic evaluation was unremarkable.  MRI of the brain revealed chronic microvascular changes and an old left basal ganglia lacunar infarction.  A dopamine uptake scan was consistent with Parkinson's disease or a parkinsonian syndrome.\par \par When last seen, she was on Sinemet 25/100 1-1/2 tablets 3 times a day.  She noted no significant improvement in her tremor or gait.  She remained on risperidone 0.5 mg at bedtime.  \par \par She subsequently increased her Sinemet dose to 2 tablets 3 times a day.  She reports headaches after taking her Sinemet pills.  Her gait and tremor are no better.  She complains of a stooped gait, small steps and low back pain.  She remains tremulous.

## 2021-10-11 NOTE — ASSESSMENT
[FreeTextEntry1] : Ms. 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  The absence of response to Sinemet might imply that she suffers from atypical Parkinson's.  In the absence of clinical improvement, I suggested decreasing her Sinemet 25/100 dose from 2 to 1 tablet 3 times a day.  If she notes no deterioration, she may decrease the dose to half a tablet 3 times a day and eventually discontinue it.\par \par She will return to the office in 6 months for routine follow-up.  Telephone contact will be maintained in the meantime.

## 2021-10-11 NOTE — PHYSICAL EXAM
[FreeTextEntry1] : Constitutional:  Patient was elderly but well-developed and in no acute distress. \par \par Head:  Normocephalic, atraumatic.\par \par Neck:  Supple with full range of motion. \par \par Cardiovascular:  Cardiac rhythm was regular without murmur. There were no carotid bruits. Peripheral pulses were full and symmetric. \par \par Respiratory:  Lungs were clear. \par \par Abdomen:  Soft and nontender. \par \par Spine:  Nontender. \par \par Skin:  There were no rashes. \par \par NEUROLOGICAL EXAMINATION:\par \par Mental Status: Patient was alert and oriented. Speech was fluent. There was no dysarthria. \par \par Cranial Nerves: \par \par II: She could finger count bilaterally. Pupils were equal and reactive. Visual fields were full. Funduscopic examination was normal. \par \par III, IV, VI:  Eye movements were full without nystagmus. \par \par V: Facial sensation was intact. \par \par VII: Facial strength was normal.  There was mild hypomimia.\par \par VIII: Hearing was equal. \par \par IX, X: Palatal movement was normal. Phonation was normal. \par \par XI: Sternocleidomastoids and trapezii were normal. \par \par XII: Tongue was midline and movements normal. There was no lingual atrophy or fasciculations. \par \par Motor Examination: Muscle bulk, tone and strength were normal.  There was a postural and action tremor in her upper extremities.  There was mild cogwheel rigidity at the elbows.  Fine finger and rapid alternating movements were slow.\par \par Sensory Examination: Pinprick, vibration and joint position sense were intact. \par \par Reflexes: DTRs were 2+ throughout. \par \par Plantar Responses: Plantar responses were flexor. \par \par Coordination/Cerebellar Function: There was no dysmetria on finger to nose or heel to shin testing. \par \par Gait/Stance: Posture was stooped and strides shuffling.  There was decreased arm swing and turns were decomposed.  Tandem was poor.  She was unsteady on Romberg testing.\par

## 2021-11-07 NOTE — H&P PST ADULT - NSANTHOSAYNRD_GEN_A_CORE
(0) understands/communicates without difficulty Pfizer dose 1 and 2 No. ALLAN screening performed.  STOP BANG Legend: 0-2 = LOW Risk; 3-4 = INTERMEDIATE Risk; 5-8 = HIGH Risk

## 2021-11-18 PROCEDURE — 99443: CPT | Mod: 95

## 2021-11-19 ENCOUNTER — APPOINTMENT (OUTPATIENT)
Dept: INTERNAL MEDICINE | Facility: CLINIC | Age: 76
End: 2021-11-19
Payer: MEDICARE

## 2021-11-19 ENCOUNTER — RESULT REVIEW (OUTPATIENT)
Age: 76
End: 2021-11-19

## 2021-11-19 ENCOUNTER — OUTPATIENT (OUTPATIENT)
Dept: OUTPATIENT SERVICES | Facility: HOSPITAL | Age: 76
LOS: 1 days | End: 2021-11-19

## 2021-11-19 VITALS — TEMPERATURE: 97.3 F

## 2021-11-19 VITALS
BODY MASS INDEX: 22.56 KG/M2 | WEIGHT: 132.13 LBS | HEIGHT: 64 IN | SYSTOLIC BLOOD PRESSURE: 130 MMHG | HEART RATE: 49 BPM | DIASTOLIC BLOOD PRESSURE: 70 MMHG | OXYGEN SATURATION: 97 %

## 2021-11-19 DIAGNOSIS — Z98.49 CATARACT EXTRACTION STATUS, UNSPECIFIED EYE: Chronic | ICD-10-CM

## 2021-11-19 DIAGNOSIS — Z98.89 OTHER SPECIFIED POSTPROCEDURAL STATES: Chronic | ICD-10-CM

## 2021-11-19 LAB
A1C WITH ESTIMATED AVERAGE GLUCOSE RESULT: 4.9 % — SIGNIFICANT CHANGE UP (ref 4–5.6)
CHOLEST SERPL-MCNC: 218 MG/DL — HIGH
ESTIMATED AVERAGE GLUCOSE: 94 — SIGNIFICANT CHANGE UP
HDLC SERPL-MCNC: 78 MG/DL — SIGNIFICANT CHANGE UP
LIPID PNL WITH DIRECT LDL SERPL: 132 MG/DL — HIGH
NON HDL CHOLESTEROL: 140 MG/DL — HIGH
TRIGL SERPL-MCNC: 38 MG/DL — SIGNIFICANT CHANGE UP

## 2021-11-19 PROCEDURE — 99214 OFFICE O/P EST MOD 30 MIN: CPT | Mod: GC

## 2021-11-19 NOTE — PHYSICAL EXAM
[No Acute Distress] : no acute distress [Well Nourished] : well nourished [Well Developed] : well developed [Well-Appearing] : well-appearing [Normal Sclera/Conjunctiva] : normal sclera/conjunctiva [PERRL] : pupils equal round and reactive to light [EOMI] : extraocular movements intact [Normal Outer Ear/Nose] : the outer ears and nose were normal in appearance [Normal Oropharynx] : the oropharynx was normal [No JVD] : no jugular venous distention [No Lymphadenopathy] : no lymphadenopathy [Supple] : supple [Thyroid Normal, No Nodules] : the thyroid was normal and there were no nodules present [No Respiratory Distress] : no respiratory distress  [No Accessory Muscle Use] : no accessory muscle use [Clear to Auscultation] : lungs were clear to auscultation bilaterally [Normal Rate] : normal rate  [Regular Rhythm] : with a regular rhythm [Normal S1, S2] : normal S1 and S2 [No Murmur] : no murmur heard [Pedal Pulses Present] : the pedal pulses are present [No Edema] : there was no peripheral edema [Soft] : abdomen soft [Non Tender] : non-tender [Non-distended] : non-distended [No CVA Tenderness] : no CVA  tenderness [No Spinal Tenderness] : no spinal tenderness [No Joint Swelling] : no joint swelling [Grossly Normal Strength/Tone] : grossly normal strength/tone [No Rash] : no rash [Coordination Grossly Intact] : coordination grossly intact [No Focal Deficits] : no focal deficits [Normal Gait] : normal gait [Normal Affect] : the affect was normal [Normal Insight/Judgement] : insight and judgment were intact

## 2021-11-20 NOTE — ASSESSMENT
[FreeTextEntry1] : The patient is a 77 y/o F w/ PMH HTN, Psychosis (on risperidone), and Parkinson's disease who presents for routine follow up. Patient has no complaints at this time.\par \par #HTN:\par - Continue amlodipine 10 mg\par \par #Psychosis:\par - Asymptomatic\par - Continue Risperidone\par \par #Parkinson's Disease:\par - Continue Sinemet \par - F/u w/ neurology \par \par #HCM:\par - Flu vaccine today\par - Mammogram: no longer indicated\par - DEXA Screening: last completed in 2019 - T score -1. \par - A1c and lipid panel for regular screening per patient request\par -Offered lifeline bracelet - patient declined - notes she has good peripheral support around her despite living alone. \par \par RTC in 6 months via telehealth per her request\par \par Case Discussed w/ Dr. Ibanez\par \par Chicho Stahl MD\par PGY-1 Internal Medicine\par Medical Specialties at [facility] \par [employer] [facility]\par \par

## 2021-11-20 NOTE — HISTORY OF PRESENT ILLNESS
[FreeTextEntry1] : Routine Check up [de-identified] : The patient is a 77 y/o F w/ PMH HTN, Psychosis (on risperidone), and Parkinson's disease who presents for routine follow up. Patient has no complaints at this time.\par \par Patient reports she is tolerating her medications well. She still has a tremor from Parkinson's but notes it is not getting worse.\par \par Patient denies any hallucinations or delusions at this time, no issues w/ risperidone.  \par \par Patient lives alone, however has relative living above her. Patient also regularly calls daughter and meets up with her once a week. She is able to ambulate independently and is able to perform all her ADLs on her own.

## 2021-11-29 DIAGNOSIS — I10 ESSENTIAL (PRIMARY) HYPERTENSION: ICD-10-CM

## 2021-11-29 DIAGNOSIS — F29 UNSPECIFIED PSYCHOSIS NOT DUE TO A SUBSTANCE OR KNOWN PHYSIOLOGICAL CONDITION: ICD-10-CM

## 2021-11-29 DIAGNOSIS — Z23 ENCOUNTER FOR IMMUNIZATION: ICD-10-CM

## 2021-11-29 DIAGNOSIS — G20 PARKINSON'S DISEASE: ICD-10-CM

## 2022-02-09 ENCOUNTER — APPOINTMENT (OUTPATIENT)
Dept: INTERNAL MEDICINE | Facility: CLINIC | Age: 77
End: 2022-02-09
Payer: MEDICARE

## 2022-02-09 ENCOUNTER — OUTPATIENT (OUTPATIENT)
Dept: OUTPATIENT SERVICES | Facility: HOSPITAL | Age: 77
LOS: 1 days | End: 2022-02-09

## 2022-02-09 VITALS
DIASTOLIC BLOOD PRESSURE: 70 MMHG | SYSTOLIC BLOOD PRESSURE: 128 MMHG | OXYGEN SATURATION: 97 % | HEART RATE: 49 BPM | WEIGHT: 133 LBS | HEIGHT: 64 IN | BODY MASS INDEX: 22.71 KG/M2

## 2022-02-09 DIAGNOSIS — Z98.49 CATARACT EXTRACTION STATUS, UNSPECIFIED EYE: Chronic | ICD-10-CM

## 2022-02-09 DIAGNOSIS — Z86.03 PERSONAL HISTORY OF NEOPLASM OF UNCERTAIN BEHAVIOR: ICD-10-CM

## 2022-02-09 DIAGNOSIS — B36.0 PITYRIASIS VERSICOLOR: ICD-10-CM

## 2022-02-09 DIAGNOSIS — Z23 ENCOUNTER FOR IMMUNIZATION: ICD-10-CM

## 2022-02-09 DIAGNOSIS — Z98.89 OTHER SPECIFIED POSTPROCEDURAL STATES: Chronic | ICD-10-CM

## 2022-02-09 PROCEDURE — 99213 OFFICE O/P EST LOW 20 MIN: CPT

## 2022-02-09 RX ORDER — TERBINAFINE HYDROCHLORIDE 1 G/100G
1 CREAM TOPICAL
Qty: 1 | Refills: 1 | Status: COMPLETED | COMMUNITY
Start: 2022-02-09 | End: 2022-03-09

## 2022-02-09 NOTE — PHYSICAL EXAM
[Normal] : no acute distress, well nourished, well developed and well-appearing [de-identified] : hyperpigmented lesions noted on right side of chest wall with a few satellite lesions.

## 2022-02-09 NOTE — HISTORY OF PRESENT ILLNESS
[FreeTextEntry8] : Rash present for about 3 weeks.  Patient stating that she started seeing dark spots, that are itchy about 3 weeks ago.  Using hydrocortisone without any relief. \par \par No fever or chills, no change to medications, soaps or lotions.

## 2022-02-10 PROCEDURE — 99443: CPT | Mod: 95

## 2022-04-14 ENCOUNTER — APPOINTMENT (OUTPATIENT)
Dept: NEUROLOGY | Facility: CLINIC | Age: 77
End: 2022-04-14
Payer: MEDICARE

## 2022-04-14 ENCOUNTER — APPOINTMENT (OUTPATIENT)
Dept: SURGICAL ONCOLOGY | Facility: CLINIC | Age: 77
End: 2022-04-14
Payer: MEDICARE

## 2022-04-14 VITALS
OXYGEN SATURATION: 98 % | WEIGHT: 133 LBS | HEART RATE: 53 BPM | DIASTOLIC BLOOD PRESSURE: 77 MMHG | TEMPERATURE: 96.9 F | HEIGHT: 64 IN | BODY MASS INDEX: 22.71 KG/M2 | RESPIRATION RATE: 16 BRPM | SYSTOLIC BLOOD PRESSURE: 164 MMHG

## 2022-04-14 VITALS
HEART RATE: 66 BPM | BODY MASS INDEX: 22.71 KG/M2 | DIASTOLIC BLOOD PRESSURE: 88 MMHG | WEIGHT: 133 LBS | HEIGHT: 64 IN | SYSTOLIC BLOOD PRESSURE: 190 MMHG

## 2022-04-14 PROCEDURE — 99214 OFFICE O/P EST MOD 30 MIN: CPT

## 2022-04-14 PROCEDURE — 99213 OFFICE O/P EST LOW 20 MIN: CPT

## 2022-04-14 NOTE — PHYSICAL EXAM
[Normal] : supple, no neck mass and thyroid not enlarged [Normal Neck Lymph Nodes] : normal neck lymph nodes  [Normal Supraclavicular Lymph Nodes] : normal supraclavicular lymph nodes [Normal Groin Lymph Nodes] : normal groin lymph nodes [Normal Axillary Lymph Nodes] : normal axillary lymph nodes [Normal] : full range of motion and no deformities appreciated [de-identified] : Below

## 2022-04-14 NOTE — REASON FOR VISIT
[Follow-Up Visit] : a follow-up visit for [Other: _____] : [unfilled] [FreeTextEntry2] : Large diameter plantar melanoma in situ

## 2022-04-14 NOTE — HISTORY OF PRESENT ILLNESS
[de-identified] : 77 year-old lady with a history of a previous large diameter melanoma in situ involving the plantar aspect of her RIGHT HALLUX in January 2020.\par \par November 2020:\par Excision of a new, diameter (2 x 1 cm) pigmented lesion adjacent to the skin graft from the prior reconstruction.\par Surgical pathology: Scar tissue with overlying basilar hyperpigmentation\par \par Prior to the recent operation, she was seen by dermatology.\par Their biopsies were benign, BUT they were considered discordant due to potential "under sampling".\par \par \par She presents for follow-up of her melanoma.\par \par \par January 2020: wide excision of a large diameter melanoma in situ from the outer RIGHT HALLUX, with negative margins.\par Reconstruction by Dr. Jabier ARMAS.\par There were additional, peripheral, pigmented lesions that were biopsied and were benign.\par The only "proximate" margin was the lateral nail bed.\par \par \par August 2020 at a scheduled visit with me, she had a new area of pigmentation on the bottom of the same digit.\par This was separate from the site of excision and reconstruction.\par She first noticed the area ~May 2020.\par Asymptomatic.\par No interval growth, but persistence.\par I had referred her to our dermatology department (Dr Wilder AMBRIZ) for further evaluation.\par \par She had "scouting biopsies" was performed which showed atypical melanocytic hyperplasia, without atypia.\par \par \par No other specific or constitutional signs or symptoms.\par \par November 2019, she was referred by her podiatrist Dr. John RANDHAWA DPM with a melanoma in situ of the plantar/lateral aspect of the RIGHT HALLUX.\par \par This was an asymptomatic pigmented lesion of unclear duration noticed by her podiatrist when she went for foot care\par \par 2018 she was seen in our dermatology department for an epidermal inclusion cyst of the right posterior shoulder.\par She did not have a dermatologic survey at that time\par suggested followup, especially given the new pigmentation next to the resection site of her melanoma.\par \par \par Derm:\par She was originally referred by podiatry.\par In the past she has been reluctant to see dermatology.\par She is now agreeable, and I contacted our nurse navigators to arrange for that visit.\par \par \par PMD: Dr. Cody HAQUE, in our medical clinic\par Typically sees one of the residents.\par \par No pacemaker or defibrillator.\par No anticoagulants\par \par PMH: \par Hypertension treated with amlodipine and chlorthalidone.\par Bradycardia.\par \par History of psychoses, treated with risperidone\par Follows up with psychiatry - Dr. Yanna SRINIVASAN.\par \par + Parkinson's, treated with carbidopa/levodopa.\par Neurology: Dr. Suhail BARNES\par \par She also takes vitamin D and calcium\par \par Previous thyroidectomy for benign disease, 1975\par \par She has a history of small bowel obstructions from adhesive bands from a previous hysterectomy.\par 2013 she had an exploratory laparotomy, with enterectomy.\par \par \par She had cataract surgery in 2017.\par Eye examination in March 2020 was unremarkable -Dr. Marko Hull.\par 2020 visit was also unremarkable.\par \par \par She had a HUBER/BSO for benign disease in 1988.\par She has not seen a gynecologist since that time.\par \par \par Mammography in the fall of 2018 was unremarkable.\par She does NOT want to return for any follow-up studies.\par \par \par 2017 colonoscopy Dr. Natacha LANG, benign polypectomy\par

## 2022-04-14 NOTE — REVIEW OF SYSTEMS
[Negative] : Heme/Lymph [FreeTextEntry3] : History of cataract surgery [FreeTextEntry5] : Hypertension [FreeTextEntry8] : History of bowel obstructions [de-identified] : Skin cancer [de-identified] : Medications include risperidone [de-identified] : Tremors

## 2022-04-14 NOTE — ASSESSMENT
[FreeTextEntry1] : Clinically doing well.\par \par Presently, imaging studies are warranted.\par \par I have asked to see her in another year, sooner if needed

## 2022-04-14 NOTE — HISTORY OF PRESENT ILLNESS
[FreeTextEntry1] : Ms. Rebecca Johnson returned to the office having been last seen on October 11, 2021.  She is a 77 year right-handed patient with history of psychosis maintained on neuroleptics.  She had a long history of arm tremors and family history of a similar disorder.  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  She appeared extremely parkinsonian.  I was uncertain whether this was entirely neuroleptic induced or whether she had superimposed Parkinson's disease.  She appeared cognitively well. \par \par She was accompanied to the office by her daughter Bre.\par \par A comprehensive serologic evaluation was unremarkable.  MRI of the brain revealed chronic microvascular changes and an old left basal ganglia lacunar infarction.  A dopamine uptake scan was consistent with Parkinson's disease or a parkinsonian syndrome.\par \par Because of uncertainty regarding benefit with use of Sinemet, she has tapered her dose to Sinemet 25/100 1 tablet twice a day.  She is still uncertain whether it provides her with any benefit.  She complains of a hand tremor that causes her difficulty when eating.  She uses a spoon rather than a fork.  She has no trouble brushing her teeth or combing her hair.  She walks slowly but independently.  Her mind is clear.\par \par She remains on risperidone 0.5 mg daily, amlodipine, vitamin D and calcium.

## 2022-04-14 NOTE — PHYSICAL EXAM
[FreeTextEntry1] : Constitutional:  Patient was elderly but well-developed and in no acute distress. \par \par Head:  Normocephalic, atraumatic.\par \par Neck:  Supple with full range of motion. \par \par Cardiovascular:  Cardiac rhythm was regular without murmur. There were no carotid bruits. Peripheral pulses were full and symmetric. \par \par Respiratory:  Lungs were clear. \par \par Abdomen:  Soft and nontender. \par \par Spine:  Nontender. \par \par Skin:  There were no rashes. \par \par NEUROLOGICAL EXAMINATION:\par \par Mental Status: Patient was alert and oriented. Speech was fluent. There was no dysarthria.  She was mildly hypophonic.  There appeared to be a mild vocal tremor as well.\par \par Cranial Nerves: \par \par II: She could finger count bilaterally. Pupils were equal and reactive. Visual fields were full. Funduscopic examination was normal. \par \par III, IV, VI:  Eye movements were full without nystagmus. \par \par V: Facial sensation was intact. \par \par VII: Facial strength was normal.  There was mild hypomimia.\par \par VIII: Hearing was equal. \par \par IX, X: Palatal movement was normal. Phonation was normal. \par \par XI: Sternocleidomastoids and trapezii were normal. \par \par XII: Tongue was midline and movements normal. There was no lingual atrophy or fasciculations. \par \par Motor Examination: Muscle bulk, tone and strength were normal.  There was a postural and action tremor in her upper extremities.  There was mild cogwheel rigidity at the elbows.  Fine finger and rapid alternating movements were slow.  There was no rest tremor.  There was a mild head tremor.\par \par Sensory Examination: Pinprick, vibration and joint position sense were intact. \par \par Reflexes: DTRs were 2+ throughout. \par \par Plantar Responses: Plantar responses were flexor. \par \par Coordination/Cerebellar Function: There was no dysmetria on finger to nose or heel to shin testing. \par \par Gait/Stance: Posture was stooped and strides short.  There was decreased arm swing and turns were decomposed.  Tandem was unsteady.  \par

## 2022-04-14 NOTE — CONSULT LETTER
[Dear  ___] : Dear  [unfilled], [Consult Letter:] : I had the pleasure of evaluating your patient, [unfilled]. [Please see my note below.] : Please see my note below. [Consult Closing:] : Thank you very much for allowing me to participate in the care of this patient.  If you have any questions, please do not hesitate to contact me. [Sincerely,] : Sincerely, [FreeTextEntry3] : .Signature [DrKm  ___] : Dr. FATIMA details… detailed exam

## 2022-04-14 NOTE — ASSESSMENT
[FreeTextEntry1] : Ms. 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  She remains extremely functional.  She is cognitively well.  It is unclear whether levodopa replacement has resulted in any motor benefit.  She may adjust her dose of Sinemet as she feels necessary.  She declines a prescription for physical therapy.  I will communicate with her psychiatrist Dr. Yanna Garcia.  She will return to the office in 6 months for routine follow-up.  Telephone contact will be maintained in the meantime.

## 2022-05-04 ENCOUNTER — NON-APPOINTMENT (OUTPATIENT)
Age: 77
End: 2022-05-04

## 2022-05-31 ENCOUNTER — APPOINTMENT (OUTPATIENT)
Dept: DERMATOLOGY | Facility: CLINIC | Age: 77
End: 2022-05-31
Payer: MEDICARE

## 2022-05-31 DIAGNOSIS — D23.9 OTHER BENIGN NEOPLASM OF SKIN, UNSPECIFIED: ICD-10-CM

## 2022-05-31 PROCEDURE — 99213 OFFICE O/P EST LOW 20 MIN: CPT

## 2022-06-17 ENCOUNTER — APPOINTMENT (OUTPATIENT)
Dept: INTERNAL MEDICINE | Facility: CLINIC | Age: 77
End: 2022-06-17
Payer: MEDICARE

## 2022-06-17 ENCOUNTER — OUTPATIENT (OUTPATIENT)
Dept: OUTPATIENT SERVICES | Facility: HOSPITAL | Age: 77
LOS: 1 days | End: 2022-06-17

## 2022-06-17 VITALS
HEART RATE: 42 BPM | TEMPERATURE: 96.5 F | SYSTOLIC BLOOD PRESSURE: 130 MMHG | WEIGHT: 130 LBS | OXYGEN SATURATION: 98 % | HEIGHT: 65 IN | DIASTOLIC BLOOD PRESSURE: 62 MMHG | BODY MASS INDEX: 21.66 KG/M2

## 2022-06-17 DIAGNOSIS — Z98.89 OTHER SPECIFIED POSTPROCEDURAL STATES: Chronic | ICD-10-CM

## 2022-06-17 DIAGNOSIS — Z98.49 CATARACT EXTRACTION STATUS, UNSPECIFIED EYE: Chronic | ICD-10-CM

## 2022-06-17 PROCEDURE — 99442: CPT | Mod: GE,95

## 2022-06-17 NOTE — ASSESSMENT
[FreeTextEntry1] : 76 y/o F w/ PMH HTN, Psychosis, and Parkinson's disease who presents for routine follow up.\par \par #HTN:\par - Pt keeps log at home, SBP in 120s\par - Continue amlodipine 10 mg\par \par #Psychosis:\par - Asymptomatic\par - Continue Risperidone\par \par #Parkinson's Disease:\par - Continue Sinemet, prescribed per neuro\par - F/u with neurology \par \par #Osteopenia\par - Noted on DEXA 2019\par - c/w calcium, vitamin D\par \par #Melanoma in situ\par - Care per dermatology\par \par #HCM:\par - Flu vaccine [id number] UTD\par - Breast cancer screening: aged out\par - Colon cancer screening: aged out\par - Lung cancer screening: never smoker\par - DEXA Screening: last completed in 2019 - T score -1.5. Repeat in 3-5 years\par - COVID x3, educated on benefits of obtaining 4th dose\par - ASCVD risk noted to be 29.3%. Unclear why patient was never on statin. Defer to next in-person visit to have discussion on risk/benefits\par \par Augie Abraham MD \par Case discussed with Dr. Cortez\par RTC in November for annual in-person visit\par \par Time spent: 15 min

## 2022-06-17 NOTE — HISTORY OF PRESENT ILLNESS
[Home] : at home, [unfilled] , at the time of the visit. [Medical Office: ([facility])___] : at the medical office located in  [Verbal consent obtained from patient] : the patient, [unfilled] [de-identified] : 77 y/o F w/ PMH HTN, Psychosis, and Parkinson's disease who presents for routine follow up. Patient last seen in clinic last November. Patient has been doing well. No acute complaints today. Pt is functional and ambulatory, still drives. Lives at home alone. Takes BP at home, last reading was 120/63. Compliant with medications. Has received 3 doses COVID vaccine. No fevers, chills, chest pain, sob, abd pain, n/v/d.

## 2022-06-21 DIAGNOSIS — I10 ESSENTIAL (PRIMARY) HYPERTENSION: ICD-10-CM

## 2022-06-21 DIAGNOSIS — M85.80 OTHER SPECIFIED DISORDERS OF BONE DENSITY AND STRUCTURE, UNSPECIFIED SITE: ICD-10-CM

## 2022-06-21 DIAGNOSIS — F29 UNSPECIFIED PSYCHOSIS NOT DUE TO A SUBSTANCE OR KNOWN PHYSIOLOGICAL CONDITION: ICD-10-CM

## 2022-06-21 DIAGNOSIS — G20 PARKINSON'S DISEASE: ICD-10-CM

## 2022-07-12 ENCOUNTER — NON-APPOINTMENT (OUTPATIENT)
Age: 77
End: 2022-07-12

## 2022-07-12 ENCOUNTER — APPOINTMENT (OUTPATIENT)
Dept: OPHTHALMOLOGY | Facility: CLINIC | Age: 77
End: 2022-07-12

## 2022-07-12 PROCEDURE — 92134 CPTRZ OPH DX IMG PST SGM RTA: CPT

## 2022-07-12 PROCEDURE — 92014 COMPRE OPH EXAM EST PT 1/>: CPT

## 2022-09-08 PROCEDURE — 99443: CPT | Mod: 95

## 2022-11-02 ENCOUNTER — APPOINTMENT (OUTPATIENT)
Dept: NEUROLOGY | Facility: CLINIC | Age: 77
End: 2022-11-02

## 2022-11-02 VITALS
BODY MASS INDEX: 21.33 KG/M2 | DIASTOLIC BLOOD PRESSURE: 82 MMHG | HEART RATE: 66 BPM | SYSTOLIC BLOOD PRESSURE: 180 MMHG | HEIGHT: 65 IN | WEIGHT: 128 LBS

## 2022-11-02 PROCEDURE — 99214 OFFICE O/P EST MOD 30 MIN: CPT

## 2022-11-02 NOTE — ASSESSMENT
[FreeTextEntry1] : Ms. 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  She remains very stable from both motor and cognitive perspectives.  There is no evidence of cognitive decline.  She is extremely functional.  I would not recommend any change in her Sinemet regimen.  Her psychosis appears well controlled on low-dose risperidone.  I suggested that she take all precautions to avoid falling.  She will return to the office in 6 months for routine follow-up.  Telephone contact will be maintained in the meantime.

## 2022-11-02 NOTE — HISTORY OF PRESENT ILLNESS
[FreeTextEntry1] : Ms. Rebecca Johnson returned to the office having been last seen on April, 14 2022.  She is a 77 year right-handed patient with history of psychosis maintained on neuroleptics.  She had a long history of arm tremors and family history of a similar disorder.  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  She appeared extremely parkinsonian.  I was uncertain whether this was entirely neuroleptic induced or whether she had superimposed Parkinson's disease.  She appeared cognitively well. \par \par She was accompanied to the office by her daughter Bre ([phone]).\par \par A comprehensive serologic evaluation was unremarkable.  MRI of the brain revealed chronic microvascular changes and an old left basal ganglia lacunar infarction.  A dopamine uptake scan was consistent with Parkinson's disease or a parkinsonian syndrome.\par \par Mrs. Johnson remains on Sinemet 25/100 1 tablet 3 times a day, risperidone 0.5 mg daily, amlodipine, vitamin D and calcium.  She is doing well and this was confirmed by her daughter.  She lives alone in [place].  She drives locally to [employer] and shopping.  Her daughter assists her with bill paying.  She is exhibited no memory difficulties or hallucinations.  Her gait is stable.  She does not use a cane.  Her hand tremors persist.\par

## 2022-11-02 NOTE — PHYSICAL EXAM
[FreeTextEntry1] : Constitutional:  Patient was elderly but well-developed and in no acute distress. \par \par Head:  Normocephalic, atraumatic.\par \par Neck:  Supple with full range of motion. \par \par Cardiovascular:  Cardiac rhythm was regular without murmur. There were no carotid bruits. Peripheral pulses were full and symmetric. \par \par Respiratory:  Lungs were clear. \par \par Abdomen:  Soft and nontender. \par \par Spine:  Nontender. \par \par Skin:  There were no rashes. \par \par NEUROLOGICAL EXAMINATION:\par \par Mental Status: Patient was alert and oriented. Speech was fluent. There was no dysarthria.  She was mildly hypophonic.  There appeared to be a mild vocal tremor as well.  She scored 28 out of 30 on MMSE.  She made 1 error in recall and had difficulty copying interlocking pentagons.  Her handwriting was tremulous.\par \par Cranial Nerves: \par \par II: She could finger count bilaterally. Pupils were equal and reactive. Visual fields were full.  The right optic disks were sharp.  The left was not visualized.\par \par III, IV, VI:  Eye movements were full without nystagmus. \par \par V: Facial sensation was intact. \par \par VII: Facial strength was normal.  There was mild hypomimia.\par \par VIII: Hearing was equal. \par \par IX, X: Palatal movement was normal. Phonation was normal. \par \par XI: Sternocleidomastoids and trapezii were normal. \par \par XII: Tongue was midline and movements normal. There was no lingual atrophy or fasciculations. \par \par Motor Examination: Muscle bulk, tone and strength were normal.  There was a postural and action tremor in her upper extremities.  There was mild cogwheel rigidity at the elbows.  Fine finger and rapid alternating movements were slow.  There was no rest tremor.  There was a mild head tremor.\par \par Sensory Examination: Pinprick, vibration and joint position sense were intact. \par \par Reflexes: DTRs were 2+ throughout. \par \par Plantar Responses: Plantar responses were flexor. \par \par Coordination/Cerebellar Function: There was no dysmetria on finger to nose or heel to shin testing. \par \par Gait/Stance: Posture was stooped and strides short.  There was decreased arm swing and turns were decomposed.  Tandem was unsteady.  \par

## 2022-11-03 ENCOUNTER — OUTPATIENT (OUTPATIENT)
Dept: OUTPATIENT SERVICES | Facility: HOSPITAL | Age: 77
LOS: 1 days | End: 2022-11-03

## 2022-11-03 ENCOUNTER — APPOINTMENT (OUTPATIENT)
Dept: INTERNAL MEDICINE | Facility: CLINIC | Age: 77
End: 2022-11-03

## 2022-11-03 VITALS
WEIGHT: 130 LBS | BODY MASS INDEX: 21.66 KG/M2 | HEIGHT: 65 IN | SYSTOLIC BLOOD PRESSURE: 114 MMHG | HEART RATE: 43 BPM | DIASTOLIC BLOOD PRESSURE: 57 MMHG

## 2022-11-03 DIAGNOSIS — I10 ESSENTIAL (PRIMARY) HYPERTENSION: ICD-10-CM

## 2022-11-03 DIAGNOSIS — Z98.49 CATARACT EXTRACTION STATUS, UNSPECIFIED EYE: Chronic | ICD-10-CM

## 2022-11-03 DIAGNOSIS — Z98.89 OTHER SPECIFIED POSTPROCEDURAL STATES: Chronic | ICD-10-CM

## 2022-11-03 DIAGNOSIS — M85.80 OTHER SPECIFIED DISORDERS OF BONE DENSITY AND STRUCTURE, UNSPECIFIED SITE: ICD-10-CM

## 2022-11-03 DIAGNOSIS — G20 PARKINSON'S DISEASE: ICD-10-CM

## 2022-11-03 DIAGNOSIS — F29 UNSPECIFIED PSYCHOSIS NOT DUE TO A SUBSTANCE OR KNOWN PHYSIOLOGICAL CONDITION: ICD-10-CM

## 2022-11-03 PROCEDURE — 99442: CPT | Mod: GC,95

## 2022-11-04 NOTE — ASSESSMENT
[FreeTextEntry1] : 76 y/o F w/ PMH HTN, Psychosis, and Parkinson's disease who presents for routine 3-month follow up.\par \par #HTN:\par - Pt keeps log at home, /57\par - Continue amlodipine 10 mg, sent refill\par \par #Psychosis:\par - Asymptomatic, sees psychiatrist regularly and has appt on 12/1/22\par - Continue Risperidone\par \par #Parkinson's Disease:\par - Continue Sinemet, prescribed per neuro\par - recently had appt with neurology\par - pt stated she still has hand tremors but they do not bother her\par \par #Osteopenia\par - Noted on DEXA 2019\par - c/w calcium, vitamin D\par \par #Melanoma in situ\par - pt stated she has regular f/u w/ dermatology\par - has appt scheduled in 12/22\par \par #HCM:\par - Flu vaccine -pt is due for vaccine this year. stated she will have her daughter arrange an appt\par - Breast cancer screening: aged out\par - Colon cancer screening: aged out\par - Lung cancer screening: never smoker\par - DEXA Screening: last completed in 2019 - T score -1.5. Repeat in 2-4 years\par - COVID x4, pt will have her daughter arrange appt for COVID booster\par - ASCVD risk noted to be 29.3%. Given greater risks vs. benefits and pt's relative health, will continue to monitor pt w/o a statin\par \par Jabier Gliagias, PGY-1\par Case Discussed with Dr. Ya\par Internal Medicine, [facility]\par \par RTC in 6 months in person\par \par

## 2022-11-04 NOTE — ADDENDUM
[FreeTextEntry1] : patietn seen with audio technology, Reviewed with resident. Agree with evaluation and plan [email or web address]

## 2022-11-04 NOTE — HISTORY OF PRESENT ILLNESS
[Home] : at home, [unfilled] , at the time of the visit. [Medical Office: ([facility])___] : at the medical office located in  [Verbal consent obtained from patient] : the patient, [unfilled] [de-identified] : 75 y/o F w/ PMH HTN, Psychosis, and Parkinson's disease who presents for 3-month follow up. Patient was last seen in clinic last November. Pt stated she wanted to do the telephonic visit to avoid having to drive to the clinic. Patient has been doing well. No acute complaints today. She drives to her local [employer], shops and walks a block. Her last BP read was 114/57. She takes her medications daily and requested a refill of amlodipine. Pt stated she will set up an appt for her flu shot and COVID booster. No fevers, chills, chest pain, sob, abd pain, n/v/d. \par

## 2022-12-29 ENCOUNTER — APPOINTMENT (OUTPATIENT)
Dept: DERMATOLOGY | Facility: CLINIC | Age: 77
End: 2022-12-29
Payer: MEDICARE

## 2022-12-29 PROCEDURE — 99214 OFFICE O/P EST MOD 30 MIN: CPT

## 2023-01-30 RX ORDER — CARBIDOPA AND LEVODOPA 25; 100 MG/1; MG/1
25-100 TABLET ORAL
Qty: 540 | Refills: 3 | Status: ACTIVE | COMMUNITY
Start: 2023-01-30 | End: 1900-01-01

## 2023-04-20 ENCOUNTER — APPOINTMENT (OUTPATIENT)
Dept: SURGICAL ONCOLOGY | Facility: CLINIC | Age: 78
End: 2023-04-20
Payer: MEDICARE

## 2023-04-20 VITALS
RESPIRATION RATE: 16 BRPM | DIASTOLIC BLOOD PRESSURE: 76 MMHG | HEART RATE: 50 BPM | HEIGHT: 64 IN | SYSTOLIC BLOOD PRESSURE: 138 MMHG | OXYGEN SATURATION: 99 % | BODY MASS INDEX: 22.02 KG/M2 | WEIGHT: 129 LBS

## 2023-04-20 PROCEDURE — 99214 OFFICE O/P EST MOD 30 MIN: CPT

## 2023-04-21 NOTE — HISTORY OF PRESENT ILLNESS
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 in January 2020.\par \par November 2020:\par Excision of a new, diameter (2 x 1 cm) pigmented lesion adjacent to the skin graft from the prior reconstruction.\par Surgical pathology: Scar tissue with overlying basilar hyperpigmentation\par \par Prior to the recent operation, she was seen by dermatology.\par Their biopsies were benign, BUT they were considered discordant due to potential "under sampling".\par \par \par She presents for follow-up of her melanoma.\par \par \par January 2020: wide excision of a large diameter melanoma in situ from the outer RIGHT HALLUX, with negative margins.\par Reconstruction by Dr. Jabier ARMAS.\par There were additional, peripheral, pigmented lesions that were biopsied and were benign.\par The only "proximate" margin was the lateral nail bed.\par \par \par August 2020 at a scheduled visit with me, she had a new area of pigmentation on the bottom of the same digit.\par This was separate from the site of excision and reconstruction.\par She first noticed the area ~May 2020.\par Asymptomatic.\par No interval growth, but persistence.\par I had referred her to our dermatology department (Dr Wilder AMBRIZ) for further evaluation.\par \par She had "scouting biopsies" was performed which showed atypical melanocytic hyperplasia, without atypia.\par \par \par No other specific or constitutional signs or symptoms.\par \par November 2019, she was referred by her podiatrist Dr. John RANDHAWA DPM with a melanoma in situ of the plantar/lateral aspect of the RIGHT HALLUX.\par \par This was an asymptomatic pigmented lesion of unclear duration noticed by her podiatrist when she went for foot care\par \par 2018 she was seen in our dermatology department for an epidermal inclusion cyst of the right posterior shoulder.\par She did not have a dermatologic survey at that time\par suggested followup, especially given the new pigmentation next to the resection site of her melanoma.\par \par \par Derm:\par She was originally referred by podiatry.\par Derm: Our department.\par December 2022 visit with Dr. Nathanael YEUNG was unremarkable.\par \par \par PMD: Dr. Cody HAQUE, in our medical clinic\par Typically sees one of the residents.\par \par No pacemaker or defibrillator.\par No anticoagulants\par \par PMH: \par Hypertension treated with amlodipine and chlorthalidone.\par Bradycardia.\par \par History of psychoses, treated with risperidone\par Follows up with psychiatry - Dr. Yanna SRINIVASAN.\par \par + Parkinson's, treated with carbidopa/levodopa.\par Neurology: Dr. Suhail BARNES\par \par She also takes vitamin D and calcium\par \par Previous thyroidectomy for benign disease, 1975\par \par She has a history of small bowel obstructions from adhesive bands from a previous hysterectomy.\par 2013 she had an exploratory laparotomy, with enterectomy.\par \par \par She had cataract surgery in 2017.\par Eye examination in March 2022 was unremarkable -Dr. Marko Hull.\par \par \par \par She had a HUBER/BSO for benign disease in 1988.\par She has not seen a gynecologist since that time.\par \par \par Mammography in the fall of 2018 was unremarkable.\par She does NOT want to return for any follow-up studies.\par \par \par 2017 colonoscopy Dr. Natacha LANG, benign polypectomy\par

## 2023-04-21 NOTE — REASON FOR VISIT
[Follow-Up Visit] : a follow-up visit for [Other: _____] : [unfilled] [FreeTextEntry2] : Right foot, large diameter plantar melanoma

## 2023-04-21 NOTE — REVIEW OF SYSTEMS
[Negative] : Heme/Lymph [FreeTextEntry5] : Hypertension [FreeTextEntry8] : History of SBO [de-identified] : Melanoma [de-identified] : Parkinson's [de-identified] : History of psychoses [de-identified] : History of thyroidectomy

## 2023-04-21 NOTE — PHYSICAL EXAM
[Normal] : supple, no neck mass and thyroid not enlarged [Normal Neck Lymph Nodes] : normal neck lymph nodes  [Normal Supraclavicular Lymph Nodes] : normal supraclavicular lymph nodes [Normal Groin Lymph Nodes] : normal groin lymph nodes [Normal Axillary Lymph Nodes] : normal axillary lymph nodes [Normal] : full range of motion and no deformities appreciated [de-identified] : Below

## 2023-04-24 ENCOUNTER — APPOINTMENT (OUTPATIENT)
Dept: INTERNAL MEDICINE | Facility: CLINIC | Age: 78
End: 2023-04-24
Payer: MEDICARE

## 2023-04-24 ENCOUNTER — OUTPATIENT (OUTPATIENT)
Dept: OUTPATIENT SERVICES | Facility: HOSPITAL | Age: 78
LOS: 1 days | End: 2023-04-24

## 2023-04-24 VITALS
HEIGHT: 64 IN | SYSTOLIC BLOOD PRESSURE: 120 MMHG | OXYGEN SATURATION: 98 % | RESPIRATION RATE: 16 BRPM | HEART RATE: 63 BPM | WEIGHT: 130 LBS | BODY MASS INDEX: 22.2 KG/M2 | TEMPERATURE: 97.6 F | DIASTOLIC BLOOD PRESSURE: 60 MMHG

## 2023-04-24 DIAGNOSIS — F29 UNSPECIFIED PSYCHOSIS NOT DUE TO A SUBSTANCE OR KNOWN PHYSIOLOGICAL CONDITION: Chronic | ICD-10-CM

## 2023-04-24 DIAGNOSIS — Z98.89 OTHER SPECIFIED POSTPROCEDURAL STATES: Chronic | ICD-10-CM

## 2023-04-24 DIAGNOSIS — M85.80 OTHER SPECIFIED DISORDERS OF BONE DENSITY AND STRUCTURE, UNSPECIFIED SITE: ICD-10-CM

## 2023-04-24 DIAGNOSIS — Z86.59 PERSONAL HISTORY OF OTHER MENTAL AND BEHAVIORAL DISORDERS: ICD-10-CM

## 2023-04-24 DIAGNOSIS — Z98.49 CATARACT EXTRACTION STATUS, UNSPECIFIED EYE: Chronic | ICD-10-CM

## 2023-04-24 PROCEDURE — 99214 OFFICE O/P EST MOD 30 MIN: CPT | Mod: GC

## 2023-04-24 RX ORDER — CARBIDOPA AND LEVODOPA 25; 100 MG/1; MG/1
25-100 TABLET ORAL 3 TIMES DAILY
Qty: 540 | Refills: 1 | Status: DISCONTINUED | COMMUNITY
Start: 2021-06-03 | End: 2023-04-24

## 2023-04-24 RX ORDER — ZOSTER VACCINE RECOMBINANT, ADJUVANTED 50 MCG/0.5
50 KIT INTRAMUSCULAR
Qty: 1 | Refills: 0 | Status: ACTIVE | OUTPATIENT
Start: 2023-04-24

## 2023-04-24 NOTE — PHYSICAL EXAM
[No Acute Distress] : no acute distress [Well Nourished] : well nourished [Well Developed] : well developed [Well-Appearing] : well-appearing [Normal Sclera/Conjunctiva] : normal sclera/conjunctiva [PERRL] : pupils equal round and reactive to light [EOMI] : extraocular movements intact [Normal Outer Ear/Nose] : the outer ears and nose were normal in appearance [Normal Oropharynx] : the oropharynx was normal [No JVD] : no jugular venous distention [No Lymphadenopathy] : no lymphadenopathy [Supple] : supple [Thyroid Normal, No Nodules] : the thyroid was normal and there were no nodules present [No Respiratory Distress] : no respiratory distress  [No Accessory Muscle Use] : no accessory muscle use [Clear to Auscultation] : lungs were clear to auscultation bilaterally [Normal Rate] : normal rate  [Regular Rhythm] : with a regular rhythm [Normal S1, S2] : normal S1 and S2 [No Murmur] : no murmur heard [Pedal Pulses Present] : the pedal pulses are present [No Edema] : there was no peripheral edema [Soft] : abdomen soft [Non Tender] : non-tender [Non-distended] : non-distended [No CVA Tenderness] : no CVA  tenderness [No Spinal Tenderness] : no spinal tenderness [No Joint Swelling] : no joint swelling [Grossly Normal Strength/Tone] : grossly normal strength/tone [No Rash] : no rash [Coordination Grossly Intact] : coordination grossly intact [No Focal Deficits] : no focal deficits [Normal Affect] : the affect was normal [Normal Insight/Judgement] : insight and judgment were intact [de-identified] : No ataxic gait. Has some mild shuffling gait w/ stooped posture [de-identified] : no midline back pain or paraspinal tenderness

## 2023-04-24 NOTE — ASSESSMENT
[FreeTextEntry1] : 76 y/o F w/ PMH HTN, Psychosis, and Parkinson's disease who presents for routine 3-month follow up.\par \par #Chronic lower back pain\par Suspect spinal stensosis vs osteophyte\par - XR back for further eval\par - PT referral sent \par \par #Osteopenia\par - Continue w/ calcium and vitamin D\par - Repeat DEXA ordered given hx osteopenia, however stable from DEXA [id number]\par \par #HTN:\par Well controlled this visit.\par - Continue amlodipine 10 mg \par \par # R hallux melanoma s/p excision Jan 2020\par - Follows surgical oncology\par \par #Advanced Directive\par Extensively discussed goals of care with the patient who is currently A&Ox3 w/ intact mind and judgement. Given her diagnosis of Parkinson's disease and her advanced age, despite being independent in her ADLs at this time the importance of advanced directives was emphasized w/ patient. At this time, patient wishes to remain full code w/ all interventions at this time. However, if she is unable to make her medical decisions, she appoints her daughter as her healthcare proxy.\par - Full code at this time\par - If patient unable to make medical decisions, geovanna Bridges cell [phone], home [phone]\par \par #HCM:\par - Shingles vaccine script given to be filled at pharmacy \par - Breast cancer screening: aged out\par - Colon cancer screening: aged out\par - Lung cancer screening: never smoker\par - COVID X4 completed \par - ASCVD risk noted to be 29.3%. Given greater risks vs. benefits and pt's relative health, will continue to monitor pt w/o a statin\par - CMP, lipid panel, A1c obtained for further monitoring \par \par RTC in 3 months via telehealth\par \par Case Discussed w/ Dr. Weir \par \par Chicho Stahl MD\par PGY-2 Internal Medicine\par Medical Specialties at [facility] \par [employer]\par \par  \par

## 2023-04-24 NOTE — HISTORY OF PRESENT ILLNESS
[FreeTextEntry1] : Follow up [de-identified] : 78 y/o F w/ HTN, Psychosis, and Parkinson's disease presenting for a follow up visit. \par \par #Lower back pain\par The patient reports intermittent lower back pain associated w/ increased difficulty w/ ambulation due to the pain. The patient is bothered that she has to lean forward when walking. She has not had any falls, denies unsteadiness, just complains of slower walking speeds. She denies any back pain during the visit, reports that it occurs intermittently. The patient reports that she is functional w/ all her ADLs, drives herself, goes to all her appointments and cooks and cleans without difficulty. \par \par #hypertension\par Reports good control on amlodipine.\par

## 2023-04-25 DIAGNOSIS — Z00.00 ENCOUNTER FOR GENERAL ADULT MEDICAL EXAMINATION WITHOUT ABNORMAL FINDINGS: ICD-10-CM

## 2023-04-25 DIAGNOSIS — I10 ESSENTIAL (PRIMARY) HYPERTENSION: ICD-10-CM

## 2023-04-25 DIAGNOSIS — M85.80 OTHER SPECIFIED DISORDERS OF BONE DENSITY AND STRUCTURE, UNSPECIFIED SITE: ICD-10-CM

## 2023-04-25 DIAGNOSIS — F29 UNSPECIFIED PSYCHOSIS NOT DUE TO A SUBSTANCE OR KNOWN PHYSIOLOGICAL CONDITION: ICD-10-CM

## 2023-04-25 DIAGNOSIS — M54.50 LOW BACK PAIN, UNSPECIFIED: ICD-10-CM

## 2023-04-25 DIAGNOSIS — G20 PARKINSON'S DISEASE: ICD-10-CM

## 2023-04-25 LAB
ALBUMIN SERPL ELPH-MCNC: 5 G/DL
ALP BLD-CCNC: 94 U/L
ALT SERPL-CCNC: 15 U/L
ANION GAP SERPL CALC-SCNC: 17 MMOL/L
AST SERPL-CCNC: 26 U/L
BILIRUB SERPL-MCNC: 0.3 MG/DL
BUN SERPL-MCNC: 19 MG/DL
CALCIUM SERPL-MCNC: 10.3 MG/DL
CHLORIDE SERPL-SCNC: 102 MMOL/L
CHOLEST SERPL-MCNC: 235 MG/DL
CO2 SERPL-SCNC: 22 MMOL/L
CREAT SERPL-MCNC: 0.93 MG/DL
EGFR: 62 ML/MIN/1.73M2
ESTIMATED AVERAGE GLUCOSE: 103 MG/DL
GLUCOSE SERPL-MCNC: 108 MG/DL
HBA1C MFR BLD HPLC: 5.2 %
HDLC SERPL-MCNC: 89 MG/DL
LDLC SERPL CALC-MCNC: 135 MG/DL
NONHDLC SERPL-MCNC: 146 MG/DL
POTASSIUM SERPL-SCNC: 4.8 MMOL/L
PROT SERPL-MCNC: 7.8 G/DL
SODIUM SERPL-SCNC: 142 MMOL/L
TRIGL SERPL-MCNC: 53 MG/DL

## 2023-06-19 ENCOUNTER — APPOINTMENT (OUTPATIENT)
Dept: DERMATOLOGY | Facility: CLINIC | Age: 78
End: 2023-06-19
Payer: MEDICARE

## 2023-06-19 DIAGNOSIS — B35.3 TINEA PEDIS: ICD-10-CM

## 2023-06-19 DIAGNOSIS — Z85.820 PERSONAL HISTORY OF MALIGNANT MELANOMA OF SKIN: ICD-10-CM

## 2023-06-19 DIAGNOSIS — Z86.006 PERSONAL HISTORY OF MELANOMA IN-SITU: ICD-10-CM

## 2023-06-19 PROCEDURE — 99214 OFFICE O/P EST MOD 30 MIN: CPT

## 2023-06-19 RX ORDER — HYDROCORTISONE 1 %
12 CREAM (GRAM) TOPICAL
Qty: 1 | Refills: 5 | Status: ACTIVE | COMMUNITY
Start: 2022-12-29 | End: 1900-01-01

## 2023-06-19 RX ORDER — KETOCONAZOLE 20 MG/G
2 CREAM TOPICAL
Qty: 1 | Refills: 6 | Status: ACTIVE | COMMUNITY
Start: 2023-06-19 | End: 1900-01-01

## 2023-06-26 ENCOUNTER — APPOINTMENT (OUTPATIENT)
Dept: RADIOLOGY | Facility: IMAGING CENTER | Age: 78
End: 2023-06-26
Payer: MEDICARE

## 2023-06-26 ENCOUNTER — OUTPATIENT (OUTPATIENT)
Dept: OUTPATIENT SERVICES | Facility: HOSPITAL | Age: 78
LOS: 1 days | End: 2023-06-26
Payer: MEDICARE

## 2023-06-26 DIAGNOSIS — Z98.49 CATARACT EXTRACTION STATUS, UNSPECIFIED EYE: Chronic | ICD-10-CM

## 2023-06-26 DIAGNOSIS — M85.80 OTHER SPECIFIED DISORDERS OF BONE DENSITY AND STRUCTURE, UNSPECIFIED SITE: ICD-10-CM

## 2023-06-26 DIAGNOSIS — Z98.89 OTHER SPECIFIED POSTPROCEDURAL STATES: Chronic | ICD-10-CM

## 2023-06-26 PROCEDURE — 77080 DXA BONE DENSITY AXIAL: CPT | Mod: 26

## 2023-06-26 PROCEDURE — 72100 X-RAY EXAM L-S SPINE 2/3 VWS: CPT | Mod: 26

## 2023-06-26 PROCEDURE — 72100 X-RAY EXAM L-S SPINE 2/3 VWS: CPT

## 2023-06-26 PROCEDURE — 77080 DXA BONE DENSITY AXIAL: CPT

## 2023-06-28 ENCOUNTER — NON-APPOINTMENT (OUTPATIENT)
Age: 78
End: 2023-06-28

## 2023-07-05 ENCOUNTER — APPOINTMENT (OUTPATIENT)
Dept: INTERNAL MEDICINE | Facility: CLINIC | Age: 78
End: 2023-07-05
Payer: MEDICARE

## 2023-07-05 ENCOUNTER — OUTPATIENT (OUTPATIENT)
Dept: OUTPATIENT SERVICES | Facility: HOSPITAL | Age: 78
LOS: 1 days | End: 2023-07-05

## 2023-07-05 DIAGNOSIS — Z98.89 OTHER SPECIFIED POSTPROCEDURAL STATES: Chronic | ICD-10-CM

## 2023-07-05 PROCEDURE — 99441: CPT | Mod: GE,95

## 2023-07-13 ENCOUNTER — NON-APPOINTMENT (OUTPATIENT)
Age: 78
End: 2023-07-13

## 2023-07-19 NOTE — HISTORY OF PRESENT ILLNESS
[Home] : at home, [unfilled] , at the time of the visit. [Medical Office: ([facility])___] : at the medical office located in  [Verbal consent obtained from patient] : the patient, [unfilled] [FreeTextEntry1] : Follow up [de-identified] : 78 y/o F w/ HTN, Psychosis, and Parkinson's disease presenting for a follow up visit. \par \par #Lower Back Pain:\par The patient continues to report lower back pain however has only started physical therapy 1 week ago. She is hopeful for the results of PT in order to strengthen her back. During the last visit the patient was sent of XR of lumbar spine as well as a repeat DEXA scan. Results of both scans were discussed w/ patient.\par \par #Hypertension:\par Patient reports a normal blood pressure however is unsure of the numbers. She reports compliance with her medication.

## 2023-07-19 NOTE — ASSESSMENT
[FreeTextEntry1] : 76 y/o F w/ HTN, Psychosis, and Parkinson's disease presenting for a follow up visit. \par \par #Lower Back pain: \par Xray w/ spondylolithesis. Patient currently undergoing PT\par - Continue PT for back muscle strengthening\par \par #Osteopenia: \par DEXA 6/2023 w/ total fracture score 6.5% w/ hip fx 1.5% consistent w/ osteopenia\par - Continue regular calcium and vitamin D supplementation. \par \par #HTN:\par Well controlled this visit.\par - Continue amlodipine 10 mg \par \par # R hallux melanoma s/p excision Jan 2020\par - Follows surgical oncology\par \par #Advanced Directive\par Extensively discussed goals of care with the patient on 4/20223 who is currently A&Ox3 w/ intact mind and judgement. Given her diagnosis of Parkinson's disease and her advanced age, despite being independent in her ADLs at this time the importance of advanced directives was emphasized w/ patient. At this time, patient wishes to remain full code w/ all interventions at this time. However, if she is unable to make her medical decisions, she appoints her daughter as her healthcare proxy.\par - Full code at this time\par - If patient unable to make medical decisions, geovanna Bridges cell [phone], home [phone]\par \par #HCM:\par - Shingles vaccine script given to be filled at pharmacy \par - Breast cancer screening: aged out\par - Colon cancer screening: aged out\par - Lung cancer screening: never smoker\par - COVID X4 completed \par - ASCVD risk noted to be 29.3%. Given greater risks vs. benefits and pt's relative health, will continue to monitor pt w/o a statin\par \par RTC in 6 months either via telehealth or in person to further discuss patient's back pain if persistent despite PT. \par \par Case Discussed w/ Dr. Moreno \par \par Chicho Stahl MD\par PGY-3 Internal Medicine\par Medical Specialties at [facility] \par [employer]\par \par

## 2023-07-20 DIAGNOSIS — M54.50 LOW BACK PAIN, UNSPECIFIED: ICD-10-CM

## 2023-07-20 DIAGNOSIS — I10 ESSENTIAL (PRIMARY) HYPERTENSION: ICD-10-CM

## 2023-09-13 ENCOUNTER — APPOINTMENT (OUTPATIENT)
Dept: INTERNAL MEDICINE | Facility: CLINIC | Age: 78
End: 2023-09-13
Payer: MEDICARE

## 2023-09-13 ENCOUNTER — OUTPATIENT (OUTPATIENT)
Dept: OUTPATIENT SERVICES | Facility: HOSPITAL | Age: 78
LOS: 1 days | End: 2023-09-13

## 2023-09-13 VITALS
SYSTOLIC BLOOD PRESSURE: 120 MMHG | BODY MASS INDEX: 22.2 KG/M2 | HEIGHT: 64 IN | DIASTOLIC BLOOD PRESSURE: 71 MMHG | WEIGHT: 130 LBS

## 2023-09-13 DIAGNOSIS — G89.29 LOW BACK PAIN, UNSPECIFIED: ICD-10-CM

## 2023-09-13 DIAGNOSIS — I10 ESSENTIAL (PRIMARY) HYPERTENSION: ICD-10-CM

## 2023-09-13 DIAGNOSIS — Z98.89 OTHER SPECIFIED POSTPROCEDURAL STATES: Chronic | ICD-10-CM

## 2023-09-13 DIAGNOSIS — M54.50 LOW BACK PAIN, UNSPECIFIED: ICD-10-CM

## 2023-09-13 DIAGNOSIS — Z98.49 CATARACT EXTRACTION STATUS, UNSPECIFIED EYE: Chronic | ICD-10-CM

## 2023-09-13 PROCEDURE — 99443: CPT | Mod: GC,95

## 2023-10-10 ENCOUNTER — NON-APPOINTMENT (OUTPATIENT)
Age: 78
End: 2023-10-10

## 2023-10-10 ENCOUNTER — APPOINTMENT (OUTPATIENT)
Dept: NEUROLOGY | Facility: CLINIC | Age: 78
End: 2023-10-10
Payer: MEDICARE

## 2023-10-10 ENCOUNTER — APPOINTMENT (OUTPATIENT)
Dept: OPHTHALMOLOGY | Facility: CLINIC | Age: 78
End: 2023-10-10
Payer: MEDICARE

## 2023-10-10 VITALS
HEART RATE: 66 BPM | WEIGHT: 130 LBS | BODY MASS INDEX: 22.2 KG/M2 | HEIGHT: 64 IN | SYSTOLIC BLOOD PRESSURE: 170 MMHG | DIASTOLIC BLOOD PRESSURE: 73 MMHG

## 2023-10-10 DIAGNOSIS — G20.A1 PARKINSON'S DISEASE WITHOUT DYSKINESIA, WITHOUT MENTION OF FLUCTUATIONS: ICD-10-CM

## 2023-10-10 DIAGNOSIS — T43.505A NEUROLEPTIC INDUCED PARKINSONISM: ICD-10-CM

## 2023-10-10 DIAGNOSIS — G21.11 NEUROLEPTIC INDUCED PARKINSONISM: ICD-10-CM

## 2023-10-10 PROCEDURE — 92014 COMPRE OPH EXAM EST PT 1/>: CPT

## 2023-10-10 PROCEDURE — 92250 FUNDUS PHOTOGRAPHY W/I&R: CPT

## 2023-10-10 PROCEDURE — 99214 OFFICE O/P EST MOD 30 MIN: CPT

## 2023-11-29 ENCOUNTER — NON-APPOINTMENT (OUTPATIENT)
Age: 78
End: 2023-11-29

## 2023-12-05 ENCOUNTER — NON-APPOINTMENT (OUTPATIENT)
Age: 78
End: 2023-12-05

## 2023-12-29 ENCOUNTER — APPOINTMENT (OUTPATIENT)
Dept: DERMATOLOGY | Facility: CLINIC | Age: 78
End: 2023-12-29

## 2024-04-05 ENCOUNTER — APPOINTMENT (OUTPATIENT)
Dept: DERMATOLOGY | Facility: CLINIC | Age: 79
End: 2024-04-05
Payer: MEDICARE

## 2024-04-05 DIAGNOSIS — D22.9 MELANOCYTIC NEVI, UNSPECIFIED: ICD-10-CM

## 2024-04-05 DIAGNOSIS — L82.1 OTHER SEBORRHEIC KERATOSIS: ICD-10-CM

## 2024-04-05 DIAGNOSIS — Z12.83 ENCOUNTER FOR SCREENING FOR MALIGNANT NEOPLASM OF SKIN: ICD-10-CM

## 2024-04-05 DIAGNOSIS — L81.0 POSTINFLAMMATORY HYPERPIGMENTATION: ICD-10-CM

## 2024-04-05 PROCEDURE — 99213 OFFICE O/P EST LOW 20 MIN: CPT

## 2024-04-06 PROBLEM — Z12.83 SCREENING EXAM FOR SKIN CANCER: Status: ACTIVE | Noted: 2022-05-31

## 2024-04-06 PROBLEM — L82.1 SEBORRHEIC KERATOSES: Status: ACTIVE | Noted: 2022-05-31

## 2024-04-06 PROBLEM — D22.9 MULTIPLE BENIGN NEVI: Status: ACTIVE | Noted: 2022-05-31

## 2024-04-06 PROBLEM — L81.0 POST-INFLAMMATORY HYPERPIGMENTATION: Status: ACTIVE | Noted: 2022-05-31

## 2024-04-06 NOTE — ASSESSMENT
[FreeTextEntry1] : 1. Personal hx of large diameter MIS plantar aspect of R hallux s/p WLE with skin graft by Dr. Child Jan 2020 (path with margins free but pathology report noted at the lateral aspect MIS was seen within 2 mm of the margin). Patient noted new dark brown patch in adjacent area May 2020 s/p scouting biopsies Aug 2020 with Dr. Zambrano, one of three bx (A) showed lentiginous intraepidermal melanocytic proliferation which is concerning for recurrent MIS given patient's history, now s/p excision with Dr. Child Nov 2020, pathology with scar tissue with overlying basilar hyperpigmentation.  Today: Well healed scar with skin graft with no nodularity, repigmentation or evidence of disease recurrence at site; no lymphadenopathy  - Continue TBSE q6-12 mos, encouraged self skin checks monthly, reviewed ABCDEs of melanoma  - Continue ophthalmology examination annually, inform doctors of melanoma diagnosis   - Continue f/u with gyn, or perform self checks of genitals. Continue regular exam with dentist (has dentures, so only goes as needed, today, oral mucosa clear). - Continue follow up with Surgical Oncology (Dr. Child)   2. Post-inflammatory hyperpigmentation, R shoulder/R arm , may have mild component of macular amyloid - Discussed natural history and slow time course for resolution  - Avoid scratching/rubbing - Photoprotection reviewed including sun-protective behaviors, protective clothing, and the use of OTC broad-spectrum SPF 30+ sunscreens was advised  - Can use amlactin lotion daily to BID- buy otc if not covered - RTC if rash recurs  3. Multiple melanocytic nevi, benign - education, counseling, reassurance - ABCDEs of melanoma reviewed, rtc sooner if changing  4. Seborrheic keratoses - education, counseling, reassurance    FBSE/Healthcare maintenance -Sun protection was discussed. The proper use of broad-spectrum UVA/UVB sunscreens with SPF 30 or greater was reviewed and the need for re-application after swimming or sweating or 2-3 hours was emphasized. We talked about judicious use of clothing and avoidance of peak periods of sun exposure. I made the patient aware of the need for year-round protection. ABCDEs of melanoma were also reviewed. -Self-skin checks were also reviewed  -Counseled patient to monitor for changes - FBSE completed today, no lesions concerning for malignancy. Next FBSE due 6-12 months (MIS in 2020) - RTC if develops lesions that are new, symptomatic (bleeding, itching), changing in size/color/shape  RTC 6 mos or sooner prn

## 2024-04-06 NOTE — PHYSICAL EXAM
[FreeTextEntry3] : PE:  General: well-appearing, alert, in no acute distress; here with daughter  Full body skin exam performed examining scalp, head, face, ears, eyes, mouth, neck, chest, back, abdomen, axilla, buttock, b/l arms, b/l forearms, b/l hands, b/l fingernails, b/l thighs, b/l legs, b/l feet, b/l toenails. Genitalia deferred per patient. Pertinent findings include: - no cervical, supraclavicular, axillary, inguinal, or popliteal LAD - R hallux plantar surface with oval hyperpigmented plaque (site of skin graft), no irregular pigmentation or nodularity  - oral mucosa clear - brown macules on trunk and extremities with no concerning features on dermoscopy - brown stuck on verrucous papules and plaques on trunk and legs/arms - R shoulder, ventral arm with PIH macules/patches, no scale or erythema; smooth

## 2024-04-06 NOTE — HISTORY OF PRESENT ILLNESS
[FreeTextEntry1] : fu MIS [de-identified] : 79 year old F last seen June 2023 here for follow up of   1. Hx of large diameter MIS plantar aspect of R hallux s/p WLE with skin graft by Dr. Child Jan 2020 (path with margins free but pathology report noted at the lateral aspect MIS was seen within 2 mm of the margin). Patient noted new dark brown patch in adjacent area May 2020 s/p scouting biopsies Aug 2020 with Dr. Zambrano, one of three bx (A) showed lentiginous intraepidermal melanocytic proliferation which is concerning for recurrent MIS given patient's history, now s/p excision with Dr. Child Nov 2020, pathology with scar tissue with overlying basilar hyperpigmentation.   4/2024: Here for TBSE. Denies any new or changing spots. Saw Dr. Child 1 year ago, no concerns, doing well.   ROS: no f/c, night sweats, weight loss, HA, visual changes. Pt has dentures so she doesn't see a dentist and s/p hysterectomy so no longer sees gyn   2. Rash on R arm, stable since LV. Hx: initially itchy, treated with 3 weeks of lamisil cream, no longer itchy and denies scratching, but still has discoloration in area. Asymptomatic.

## 2024-05-02 NOTE — ASU DISCHARGE PLAN (ADULT/PEDIATRIC) - A. DRIVE A CAR, OPERATE POWER TOOLS OR MACHINERY
Take cetirizine/zyrtec or lortadine/claritin 5 mg daily. Call if not improving.  Can try fluticasone/Flonase too.    Thank you for seeing me today. It was nice to meet you!     Statement Selected

## 2024-05-15 ENCOUNTER — APPOINTMENT (OUTPATIENT)
Dept: INTERNAL MEDICINE | Facility: CLINIC | Age: 79
End: 2024-05-15
Payer: MEDICARE

## 2024-05-15 ENCOUNTER — OUTPATIENT (OUTPATIENT)
Dept: OUTPATIENT SERVICES | Facility: HOSPITAL | Age: 79
LOS: 1 days | End: 2024-05-15

## 2024-05-15 VITALS
HEART RATE: 58 BPM | WEIGHT: 123 LBS | SYSTOLIC BLOOD PRESSURE: 126 MMHG | HEIGHT: 64 IN | BODY MASS INDEX: 21 KG/M2 | OXYGEN SATURATION: 98 % | DIASTOLIC BLOOD PRESSURE: 65 MMHG

## 2024-05-15 DIAGNOSIS — I10 ESSENTIAL (PRIMARY) HYPERTENSION: ICD-10-CM

## 2024-05-15 DIAGNOSIS — G20.C PARKINSONISM, UNSPECIFIED: ICD-10-CM

## 2024-05-15 DIAGNOSIS — Z98.89 OTHER SPECIFIED POSTPROCEDURAL STATES: Chronic | ICD-10-CM

## 2024-05-15 DIAGNOSIS — Z00.00 ENCOUNTER FOR GENERAL ADULT MEDICAL EXAMINATION WITHOUT ABNORMAL FINDINGS: ICD-10-CM

## 2024-05-15 DIAGNOSIS — Z00.00 ENCOUNTER FOR GENERAL ADULT MEDICAL EXAMINATION W/OUT ABNORMAL FINDINGS: ICD-10-CM

## 2024-05-15 DIAGNOSIS — Z98.49 CATARACT EXTRACTION STATUS, UNSPECIFIED EYE: Chronic | ICD-10-CM

## 2024-05-15 LAB
ALBUMIN SERPL ELPH-MCNC: 4.5 G/DL
ALP BLD-CCNC: 97 U/L
ALT SERPL-CCNC: 18 U/L
ANION GAP SERPL CALC-SCNC: 11 MMOL/L
AST SERPL-CCNC: 31 U/L
BILIRUB SERPL-MCNC: 0.3 MG/DL
BUN SERPL-MCNC: 16 MG/DL
CALCIUM SERPL-MCNC: 9.7 MG/DL
CHLORIDE SERPL-SCNC: 103 MMOL/L
CHOLEST SERPL-MCNC: 175 MG/DL
CO2 SERPL-SCNC: 27 MMOL/L
CREAT SERPL-MCNC: 0.92 MG/DL
EGFR: 63 ML/MIN/1.73M2
ESTIMATED AVERAGE GLUCOSE: 97 MG/DL
GLUCOSE SERPL-MCNC: 73 MG/DL
HBA1C MFR BLD HPLC: 5 %
HDLC SERPL-MCNC: 80 MG/DL
LDLC SERPL CALC-MCNC: 88 MG/DL
NONHDLC SERPL-MCNC: 95 MG/DL
POTASSIUM SERPL-SCNC: 4.5 MMOL/L
PROT SERPL-MCNC: 6.9 G/DL
SODIUM SERPL-SCNC: 141 MMOL/L
TRIGL SERPL-MCNC: 29 MG/DL

## 2024-05-15 PROCEDURE — G0439: CPT

## 2024-05-15 RX ORDER — AMLODIPINE BESYLATE 10 MG/1
10 TABLET ORAL
Qty: 90 | Refills: 3 | Status: DISCONTINUED | COMMUNITY
Start: 2019-05-24 | End: 2024-05-15

## 2024-05-15 RX ORDER — AMLODIPINE BESYLATE 5 MG/1
5 TABLET ORAL
Qty: 90 | Refills: 3 | Status: ACTIVE | COMMUNITY
Start: 2024-05-15 | End: 1900-01-01

## 2024-05-15 NOTE — ASSESSMENT
[FreeTextEntry1] : 76 y/o F w/ HTN, Psychosis, and Parkinson's disease presenting for a CPE.  #HTN #LE Edema Well controlled hypertension however w/ some LE edema likely due to amlodipine - Decrease amlodipine 5 mg QD - Compression stockings  #Parkinsonism Tolerating Sinemet however w/ pill rolling tremor - To follow up w/ neurology for medication adjustment  #GOC Patient spoke with her daughter, established GOC - full code unless there's no hope for meaningful recovery.   #HCM: - A1c, CMP, Lipid profile (patient wanted to know cholesterol numbers knowing limited utility of statins given her age) - Encouraged patient to get Shingrix vaccine   RTC in 3 months via telephonic visit to discuss BPs   Case Discussed w/ Dr. Timothy Stahl MD PGY-3 Internal Medicine Medical Specialties at [facility]  [employer] [facility]

## 2024-05-15 NOTE — HISTORY OF PRESENT ILLNESS
[FreeTextEntry1] : CPE [de-identified] : 76 y/o F w/ HTN, Psychosis, and Parkinson's disease presenting for a CPE.  The patient reports that she has been feeling otherwise well and feels grateful that her mind is intact and that she is still able to complete all ADLs without any issues. She does have a slow shuffling gait and has a mild pill rolling tremor, however they are well enough controlled that she does not have any issues living alone and is active at home cooking, cleaning and working in her garden. She has not had any falls or near falls in the past year.   She did mention that she has been having some issues w/ LE edema and she saw her podiatrist who recommended decreasing her amlodipine to reduce the swelling.

## 2024-05-15 NOTE — PHYSICAL EXAM
[No Acute Distress] : no acute distress [Supple] : supple [No Respiratory Distress] : no respiratory distress  [Clear to Auscultation] : lungs were clear to auscultation bilaterally [Normal Rate] : normal rate  [Regular Rhythm] : with a regular rhythm [Soft] : abdomen soft [Non Tender] : non-tender [No CVA Tenderness] : no CVA  tenderness [No Spinal Tenderness] : no spinal tenderness [Normal Affect] : the affect was normal [Normal Insight/Judgement] : insight and judgment were intact [de-identified] : trace b/l LE edema [de-identified] : shuffling gait, mild pill rolling tremor.

## 2024-05-17 ENCOUNTER — NON-APPOINTMENT (OUTPATIENT)
Age: 79
End: 2024-05-17

## 2024-08-07 PROCEDURE — 90833 PSYTX W PT W E/M 30 MIN: CPT | Mod: 93

## 2024-08-07 PROCEDURE — 99214 OFFICE O/P EST MOD 30 MIN: CPT | Mod: 95

## 2024-09-19 ENCOUNTER — APPOINTMENT (OUTPATIENT)
Dept: NEUROLOGY | Facility: CLINIC | Age: 79
End: 2024-09-19
Payer: MEDICARE

## 2024-09-19 DIAGNOSIS — T43.505A NEUROLEPTIC INDUCED PARKINSONISM: ICD-10-CM

## 2024-09-19 DIAGNOSIS — G21.11 NEUROLEPTIC INDUCED PARKINSONISM: ICD-10-CM

## 2024-09-19 DIAGNOSIS — G20.A1 PARKINSON'S DISEASE WITHOUT DYSKINESIA, WITHOUT MENTION OF FLUCTUATIONS: ICD-10-CM

## 2024-09-19 PROCEDURE — 99446 NTRPROF PH1/NTRNET/EHR 5-10: CPT

## 2024-09-19 NOTE — HISTORY OF PRESENT ILLNESS
[Home] : at home, [unfilled] , at the time of the visit. [Medical Office: ([facility])___] : at the medical office located in  [Verbal consent obtained from patient] : the patient, [unfilled] [FreeTextEntry1] : Ms. Rebecca Johnson was last evaluated in the office on October 10, 2023.  She is an 79 year right-handed patient with history of psychosis maintained on neuroleptics.  She had a long history of arm tremors and family history of a similar disorder.  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  She appeared extremely parkinsonian.  I was uncertain whether this was entirely neuroleptic induced or whether she had superimposed Parkinson's disease.  She appeared cognitively well.   She was accompanied to the office by her daughter Bre ([phone]).  A comprehensive serologic evaluation was unremarkable.  MRI of the brain revealed chronic microvascular changes and an old left basal ganglia lacunar infarction.  A dopamine uptake scan performed in May 2021 was consistent with Parkinson's disease or a parkinsonian syndrome.  At her October 2023 visit, Mrs. Johnson remained on Sinemet 25/100 1 tablet 2 times a day, risperidone 0.5 mg daily, amlodipine, vitamin D and calcium.   She complained that Sinemet caused nausea.  She took it after meals.  She described a headache but when questioned, she really did not experience head pain but rather a "bad feeling."  She denied scalp tenderness or jaw claudication.  She denied delusions, paranoia or hallucinations.  She ambulated independently.  She lived alone.  She underwent physical therapy twice a week for 3 months but was exercising alone at home.  A telephonic visit was performed today.  She does not have video access.  She resides alone in [place].  She stated that her neighborhood is safe and she has good neighbors.  She ambulates with the use of a cane because she is bent over and her low back is weak.  She is not undergoing physical therapy.  She is very involved with her Religion.  She does her own housekeeping and laundry.  She was very happy.  She is taking Sinemet 25/101 tablet daily.  She remains on risperidone, amlodipine, vitamin D and calcium.  Past surgical history is notable for hysterectomy and oophorectomy, cataract extractions and a subtotal thyroidectomy for a benign process.  She suffers from hypertension.  Psychosis, neuroleptic and idiopathic Parkinson's disease.  She has allergies to aspirin.  She is a non-smoker and nondrinker.  She is retired.  Family history is notable for dementia and hypertension.

## 2024-09-19 NOTE — ASSESSMENT
[FreeTextEntry1] : Ms. 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  She suffers from a psychiatric disorder maintained on risperidone.  Based upon today's telephone conversation, she appears to be doing very well.  No modifications will be made in her regimen.  I suggested an in office visit next spring.  She requested a handicapped parking permit.  I suggested that she obtain the form, complete and sign it and forward to my office for completion.  Telephone contact will be maintained in the meantime.

## 2024-10-08 ENCOUNTER — NON-APPOINTMENT (OUTPATIENT)
Age: 79
End: 2024-10-08

## 2024-10-08 ENCOUNTER — APPOINTMENT (OUTPATIENT)
Dept: OPHTHALMOLOGY | Facility: CLINIC | Age: 79
End: 2024-10-08
Payer: MEDICARE

## 2024-10-08 PROCEDURE — 92134 CPTRZ OPH DX IMG PST SGM RTA: CPT

## 2024-10-08 PROCEDURE — 92014 COMPRE OPH EXAM EST PT 1/>: CPT

## 2024-11-04 ENCOUNTER — OUTPATIENT (OUTPATIENT)
Dept: OUTPATIENT SERVICES | Facility: HOSPITAL | Age: 79
LOS: 1 days | End: 2024-11-04

## 2024-11-04 ENCOUNTER — APPOINTMENT (OUTPATIENT)
Dept: INTERNAL MEDICINE | Facility: CLINIC | Age: 79
End: 2024-11-04

## 2024-11-04 VITALS
HEIGHT: 61 IN | HEART RATE: 52 BPM | SYSTOLIC BLOOD PRESSURE: 178 MMHG | BODY MASS INDEX: 23.6 KG/M2 | WEIGHT: 125 LBS | DIASTOLIC BLOOD PRESSURE: 72 MMHG | OXYGEN SATURATION: 99 %

## 2024-11-04 VITALS — SYSTOLIC BLOOD PRESSURE: 180 MMHG | DIASTOLIC BLOOD PRESSURE: 81 MMHG

## 2024-11-04 DIAGNOSIS — M40.00 POSTURAL KYPHOSIS, SITE UNSPECIFIED: ICD-10-CM

## 2024-11-04 DIAGNOSIS — R26.9 UNSPECIFIED ABNORMALITIES OF GAIT AND MOBILITY: ICD-10-CM

## 2024-11-04 DIAGNOSIS — Z98.49 CATARACT EXTRACTION STATUS, UNSPECIFIED EYE: Chronic | ICD-10-CM

## 2024-11-04 DIAGNOSIS — Z98.89 OTHER SPECIFIED POSTPROCEDURAL STATES: Chronic | ICD-10-CM

## 2024-11-04 DIAGNOSIS — G20.A1 PARKINSON'S DISEASE WITHOUT DYSKINESIA, WITHOUT MENTION OF FLUCTUATIONS: ICD-10-CM

## 2024-11-04 DIAGNOSIS — I10 ESSENTIAL (PRIMARY) HYPERTENSION: ICD-10-CM

## 2024-11-04 PROCEDURE — 99214 OFFICE O/P EST MOD 30 MIN: CPT | Mod: GC

## 2024-11-05 ENCOUNTER — NON-APPOINTMENT (OUTPATIENT)
Age: 79
End: 2024-11-05

## 2024-11-11 ENCOUNTER — NON-APPOINTMENT (OUTPATIENT)
Age: 79
End: 2024-11-11

## 2024-11-18 DIAGNOSIS — G20.A1 PARKINSON'S DISEASE WITHOUT DYSKINESIA, WITHOUT MENTION OF FLUCTUATIONS: ICD-10-CM

## 2024-11-18 DIAGNOSIS — I10 ESSENTIAL (PRIMARY) HYPERTENSION: ICD-10-CM

## 2024-11-18 DIAGNOSIS — M40.00 POSTURAL KYPHOSIS, SITE UNSPECIFIED: ICD-10-CM

## 2024-11-18 DIAGNOSIS — R26.9 UNSPECIFIED ABNORMALITIES OF GAIT AND MOBILITY: ICD-10-CM

## 2024-12-11 ENCOUNTER — APPOINTMENT (OUTPATIENT)
Dept: INTERNAL MEDICINE | Facility: CLINIC | Age: 79
End: 2024-12-11
Payer: MEDICARE

## 2024-12-11 ENCOUNTER — OUTPATIENT (OUTPATIENT)
Dept: OUTPATIENT SERVICES | Facility: HOSPITAL | Age: 79
LOS: 1 days | End: 2024-12-11

## 2024-12-11 VITALS — SYSTOLIC BLOOD PRESSURE: 127 MMHG | DIASTOLIC BLOOD PRESSURE: 84 MMHG

## 2024-12-11 DIAGNOSIS — Z98.49 CATARACT EXTRACTION STATUS, UNSPECIFIED EYE: Chronic | ICD-10-CM

## 2024-12-11 DIAGNOSIS — I10 ESSENTIAL (PRIMARY) HYPERTENSION: ICD-10-CM

## 2024-12-11 PROCEDURE — 99442: CPT | Mod: 93

## 2024-12-16 ENCOUNTER — NON-APPOINTMENT (OUTPATIENT)
Age: 79
End: 2024-12-16

## 2024-12-19 DIAGNOSIS — I10 ESSENTIAL (PRIMARY) HYPERTENSION: ICD-10-CM

## 2024-12-21 ENCOUNTER — NON-APPOINTMENT (OUTPATIENT)
Age: 79
End: 2024-12-21

## 2025-01-15 ENCOUNTER — MED ADMIN CHARGE (OUTPATIENT)
Age: 80
End: 2025-01-15

## 2025-01-15 ENCOUNTER — NON-APPOINTMENT (OUTPATIENT)
Age: 80
End: 2025-01-15

## 2025-01-15 ENCOUNTER — OUTPATIENT (OUTPATIENT)
Dept: OUTPATIENT SERVICES | Facility: HOSPITAL | Age: 80
LOS: 1 days | End: 2025-01-15

## 2025-01-15 ENCOUNTER — APPOINTMENT (OUTPATIENT)
Dept: INTERNAL MEDICINE | Facility: CLINIC | Age: 80
End: 2025-01-15
Payer: MEDICARE

## 2025-01-15 VITALS
DIASTOLIC BLOOD PRESSURE: 80 MMHG | HEART RATE: 60 BPM | OXYGEN SATURATION: 98 % | SYSTOLIC BLOOD PRESSURE: 130 MMHG | HEIGHT: 61 IN | BODY MASS INDEX: 23.22 KG/M2 | WEIGHT: 123 LBS

## 2025-01-15 DIAGNOSIS — I10 ESSENTIAL (PRIMARY) HYPERTENSION: ICD-10-CM

## 2025-01-15 DIAGNOSIS — Z98.49 CATARACT EXTRACTION STATUS, UNSPECIFIED EYE: Chronic | ICD-10-CM

## 2025-01-15 DIAGNOSIS — G20.C PARKINSONISM, UNSPECIFIED: ICD-10-CM

## 2025-01-15 DIAGNOSIS — Z71.89 OTHER SPECIFIED COUNSELING: ICD-10-CM

## 2025-01-15 DIAGNOSIS — Z23 ENCOUNTER FOR IMMUNIZATION: ICD-10-CM

## 2025-01-15 DIAGNOSIS — Z98.89 OTHER SPECIFIED POSTPROCEDURAL STATES: Chronic | ICD-10-CM

## 2025-01-15 PROCEDURE — 99213 OFFICE O/P EST LOW 20 MIN: CPT | Mod: GE

## 2025-01-23 ENCOUNTER — NON-APPOINTMENT (OUTPATIENT)
Age: 80
End: 2025-01-23

## 2025-01-29 ENCOUNTER — NON-APPOINTMENT (OUTPATIENT)
Age: 80
End: 2025-01-29

## 2025-02-07 ENCOUNTER — NON-APPOINTMENT (OUTPATIENT)
Age: 80
End: 2025-02-07

## 2025-02-11 ENCOUNTER — NON-APPOINTMENT (OUTPATIENT)
Age: 80
End: 2025-02-11

## 2025-02-12 ENCOUNTER — OUTPATIENT (OUTPATIENT)
Dept: OUTPATIENT SERVICES | Facility: HOSPITAL | Age: 80
LOS: 1 days | End: 2025-02-12

## 2025-02-12 ENCOUNTER — APPOINTMENT (OUTPATIENT)
Dept: INTERNAL MEDICINE | Facility: CLINIC | Age: 80
End: 2025-02-12
Payer: MEDICARE

## 2025-02-12 VITALS
HEART RATE: 47 BPM | SYSTOLIC BLOOD PRESSURE: 119 MMHG | HEIGHT: 63 IN | WEIGHT: 120 LBS | DIASTOLIC BLOOD PRESSURE: 65 MMHG | BODY MASS INDEX: 21.26 KG/M2

## 2025-02-12 DIAGNOSIS — Z98.89 OTHER SPECIFIED POSTPROCEDURAL STATES: Chronic | ICD-10-CM

## 2025-02-12 DIAGNOSIS — Z98.49 CATARACT EXTRACTION STATUS, UNSPECIFIED EYE: Chronic | ICD-10-CM

## 2025-02-12 DIAGNOSIS — G20.A1 PARKINSON'S DISEASE WITHOUT DYSKINESIA, WITHOUT MENTION OF FLUCTUATIONS: ICD-10-CM

## 2025-02-12 DIAGNOSIS — I10 ESSENTIAL (PRIMARY) HYPERTENSION: ICD-10-CM

## 2025-02-12 PROCEDURE — 99213 OFFICE O/P EST LOW 20 MIN: CPT | Mod: 95

## 2025-02-12 PROCEDURE — G2211 COMPLEX E/M VISIT ADD ON: CPT | Mod: 95

## 2025-02-14 ENCOUNTER — NON-APPOINTMENT (OUTPATIENT)
Age: 80
End: 2025-02-14

## 2025-02-17 DIAGNOSIS — G20.A1 PARKINSON'S DISEASE WITHOUT DYSKINESIA, WITHOUT MENTION OF FLUCTUATIONS: ICD-10-CM

## 2025-02-17 DIAGNOSIS — I10 ESSENTIAL (PRIMARY) HYPERTENSION: ICD-10-CM

## 2025-02-20 ENCOUNTER — APPOINTMENT (OUTPATIENT)
Dept: INTERNAL MEDICINE | Facility: CLINIC | Age: 80
End: 2025-02-20

## 2025-03-26 ENCOUNTER — APPOINTMENT (OUTPATIENT)
Dept: NEUROLOGY | Facility: CLINIC | Age: 80
End: 2025-03-26
Payer: MEDICARE

## 2025-03-26 VITALS — SYSTOLIC BLOOD PRESSURE: 150 MMHG | DIASTOLIC BLOOD PRESSURE: 90 MMHG | HEART RATE: 60 BPM

## 2025-03-26 DIAGNOSIS — G20.A1 PARKINSON'S DISEASE WITHOUT DYSKINESIA, WITHOUT MENTION OF FLUCTUATIONS: ICD-10-CM

## 2025-03-26 PROCEDURE — 99213 OFFICE O/P EST LOW 20 MIN: CPT

## 2025-05-16 ENCOUNTER — NON-APPOINTMENT (OUTPATIENT)
Age: 80
End: 2025-05-16

## 2025-06-06 ENCOUNTER — APPOINTMENT (OUTPATIENT)
Dept: INTERNAL MEDICINE | Facility: CLINIC | Age: 80
End: 2025-06-06
Payer: MEDICARE

## 2025-06-06 ENCOUNTER — OUTPATIENT (OUTPATIENT)
Dept: OUTPATIENT SERVICES | Facility: HOSPITAL | Age: 80
LOS: 1 days | End: 2025-06-06

## 2025-06-06 VITALS
SYSTOLIC BLOOD PRESSURE: 112 MMHG | OXYGEN SATURATION: 95 % | HEIGHT: 63 IN | DIASTOLIC BLOOD PRESSURE: 82 MMHG | WEIGHT: 124 LBS | BODY MASS INDEX: 21.97 KG/M2 | HEART RATE: 59 BPM

## 2025-06-06 DIAGNOSIS — Z98.89 OTHER SPECIFIED POSTPROCEDURAL STATES: Chronic | ICD-10-CM

## 2025-06-06 DIAGNOSIS — Z98.49 CATARACT EXTRACTION STATUS, UNSPECIFIED EYE: Chronic | ICD-10-CM

## 2025-06-06 PROCEDURE — G0439: CPT | Mod: GC

## 2025-06-10 DIAGNOSIS — I10 ESSENTIAL (PRIMARY) HYPERTENSION: ICD-10-CM

## 2025-06-10 DIAGNOSIS — G20.C PARKINSONISM, UNSPECIFIED: ICD-10-CM

## 2025-06-10 LAB
ANION GAP SERPL CALC-SCNC: 13 MMOL/L
BUN SERPL-MCNC: 19 MG/DL
CALCIUM SERPL-MCNC: 9.8 MG/DL
CHLORIDE SERPL-SCNC: 101 MMOL/L
CO2 SERPL-SCNC: 25 MMOL/L
CREAT SERPL-MCNC: 0.99 MG/DL
EGFRCR SERPLBLD CKD-EPI 2021: 57 ML/MIN/1.73M2
GLUCOSE SERPL-MCNC: 86 MG/DL
HCT VFR BLD CALC: 37.4 %
HGB BLD-MCNC: 11.7 G/DL
MCHC RBC-ENTMCNC: 28.7 PG
MCHC RBC-ENTMCNC: 31.3 G/DL
MCV RBC AUTO: 91.7 FL
PLATELET # BLD AUTO: 180 K/UL
POTASSIUM SERPL-SCNC: 4.6 MMOL/L
RBC # BLD: 4.08 M/UL
RBC # FLD: 14.6 %
SODIUM SERPL-SCNC: 139 MMOL/L
WBC # FLD AUTO: 4.61 K/UL

## 2025-07-10 NOTE — H&P PST ADULT - CARDIOVASCULAR
Quality 226: Preventive Care And Screening: Tobacco Use: Screening And Cessation Intervention: Patient screened for tobacco use and is an ex/non-smoker Quality 47: Advance Care Plan: Advance Care Planning discussed and documented in the medical record; patient did not wish or was not able to name a surrogate decision maker or provide an advance care plan. Detail Level: Detailed details… detailed exam

## 2025-07-25 ENCOUNTER — APPOINTMENT (OUTPATIENT)
Dept: DERMATOLOGY | Facility: CLINIC | Age: 80
End: 2025-07-25
Payer: MEDICARE

## 2025-07-25 ENCOUNTER — NON-APPOINTMENT (OUTPATIENT)
Age: 80
End: 2025-07-25

## 2025-07-25 VITALS — HEIGHT: 64 IN | WEIGHT: 120 LBS | BODY MASS INDEX: 20.49 KG/M2

## 2025-07-25 DIAGNOSIS — L82.1 OTHER SEBORRHEIC KERATOSIS: ICD-10-CM

## 2025-07-25 DIAGNOSIS — D23.9 OTHER BENIGN NEOPLASM OF SKIN, UNSPECIFIED: ICD-10-CM

## 2025-07-25 DIAGNOSIS — Z85.820 PERSONAL HISTORY OF MALIGNANT MELANOMA OF SKIN: ICD-10-CM

## 2025-07-25 DIAGNOSIS — D22.9 MELANOCYTIC NEVI, UNSPECIFIED: ICD-10-CM

## 2025-07-25 DIAGNOSIS — L81.0 POSTINFLAMMATORY HYPERPIGMENTATION: ICD-10-CM

## 2025-07-25 DIAGNOSIS — Z12.83 ENCOUNTER FOR SCREENING FOR MALIGNANT NEOPLASM OF SKIN: ICD-10-CM

## 2025-07-25 PROCEDURE — 99213 OFFICE O/P EST LOW 20 MIN: CPT

## 2025-08-19 ENCOUNTER — RX RENEWAL (OUTPATIENT)
Age: 80
End: 2025-08-19